# Patient Record
Sex: FEMALE | Race: WHITE | Employment: PART TIME | URBAN - METROPOLITAN AREA
[De-identification: names, ages, dates, MRNs, and addresses within clinical notes are randomized per-mention and may not be internally consistent; named-entity substitution may affect disease eponyms.]

---

## 2018-10-16 ENCOUNTER — HOSPITAL ENCOUNTER (OUTPATIENT)
Age: 18
Discharge: HOME | End: 2018-10-16
Payer: COMMERCIAL

## 2018-10-16 ENCOUNTER — HOSPITAL ENCOUNTER (OUTPATIENT)
Dept: GENERAL RADIOLOGY | Age: 18
Discharge: HOME | End: 2018-10-16
Payer: COMMERCIAL

## 2018-10-16 DIAGNOSIS — R05.9 COUGH: ICD-10-CM

## 2018-10-16 PROCEDURE — 71046 X-RAY EXAM CHEST 2 VIEWS: CPT

## 2019-01-15 ENCOUNTER — HOSPITAL ENCOUNTER (OUTPATIENT)
Age: 19
Setting detail: SPECIMEN
Discharge: HOME | End: 2019-01-15
Payer: COMMERCIAL

## 2019-01-15 PROCEDURE — 87491 CHLMYD TRACH DNA AMP PROBE: CPT

## 2019-01-15 PROCEDURE — 87071 CULTURE AEROBIC QUANT OTHER: CPT

## 2019-01-15 PROCEDURE — 87591 N.GONORRHOEAE DNA AMP PROB: CPT

## 2019-01-15 PROCEDURE — 88142 CYTOPATH C/V THIN LAYER: CPT

## 2019-01-18 LAB
CHLAMYDIA TRACHOMATIS AMPLIFIED DET: NEGATIVE
N GONORRHOEAE AMPLIFIED DET: NEGATIVE

## 2019-01-19 LAB
CULTURE: NORMAL
GRAM SMEAR: NORMAL
Lab: NORMAL
REPORT STATUS: NORMAL
SPECIMEN: NORMAL

## 2019-02-12 ENCOUNTER — HOSPITAL ENCOUNTER (OUTPATIENT)
Age: 19
Setting detail: SPECIMEN
Discharge: HOME | End: 2019-02-12
Payer: COMMERCIAL

## 2019-02-12 PROCEDURE — 87624 HPV HI-RISK TYP POOLED RSLT: CPT

## 2019-02-12 PROCEDURE — 87071 CULTURE AEROBIC QUANT OTHER: CPT

## 2019-02-15 LAB
HPV SOURCE: NORMAL
HPV, HIGH RISK OTHER: NEGATIVE

## 2019-02-16 LAB
CULTURE: NORMAL
GRAM SMEAR: NORMAL
Lab: NORMAL
REPORT STATUS: NORMAL
SPECIMEN: NORMAL

## 2019-02-21 ENCOUNTER — HOSPITAL ENCOUNTER (OUTPATIENT)
Age: 19
Setting detail: SPECIMEN
Discharge: HOME | End: 2019-02-21
Payer: COMMERCIAL

## 2019-02-21 PROCEDURE — 88305 TISSUE EXAM BY PATHOLOGIST: CPT

## 2019-07-30 ENCOUNTER — HOSPITAL ENCOUNTER (OUTPATIENT)
Dept: MRI IMAGING | Age: 19
Discharge: HOME | End: 2019-07-30
Payer: COMMERCIAL

## 2019-07-30 DIAGNOSIS — R79.89 ELEVATED DHEA: ICD-10-CM

## 2019-07-30 LAB
GFR AFRICAN AMERICAN: >60 ML/MIN/1.73M2
GFR NON-AFRICAN AMERICAN: >60 ML/MIN/1.73M2
POC CREATININE: 0.7 MG/DL (ref 0.6–1.1)

## 2019-07-30 PROCEDURE — 6360000004 HC RX CONTRAST MEDICATION: Performed by: NURSE PRACTITIONER

## 2019-07-30 PROCEDURE — 74183 MRI ABD W/O CNTR FLWD CNTR: CPT

## 2019-07-30 PROCEDURE — A9577 INJ MULTIHANCE: HCPCS | Performed by: NURSE PRACTITIONER

## 2019-07-30 RX ADMIN — GADOBENATE DIMEGLUMINE 15 ML: 529 INJECTION, SOLUTION INTRAVENOUS at 12:54

## 2019-08-13 ENCOUNTER — HOSPITAL ENCOUNTER (OUTPATIENT)
Age: 19
Setting detail: SPECIMEN
Discharge: HOME | End: 2019-08-13
Payer: COMMERCIAL

## 2019-08-13 LAB
FERRITIN: 38 NG/ML (ref 15–150)
IRON: 99 UG/DL (ref 37–145)
PCT TRANSFERRIN: 29 % (ref 10–44)
TOTAL IRON BINDING CAPACITY: 340 UG/DL (ref 250–450)
UNSATURATED IRON BINDING CAPACITY: 241 UG/DL (ref 110–370)

## 2019-08-13 PROCEDURE — 81256 HFE GENE: CPT

## 2019-08-13 PROCEDURE — 83550 IRON BINDING TEST: CPT

## 2019-08-13 PROCEDURE — 82728 ASSAY OF FERRITIN: CPT

## 2019-08-13 PROCEDURE — 84238 ASSAY NONENDOCRINE RECEPTOR: CPT

## 2019-08-13 PROCEDURE — 83540 ASSAY OF IRON: CPT

## 2019-08-15 LAB
SOLUBLE TRANSFERRIN RECEPT: 2.6 MG/L (ref 1.9–4.4)
SOLUBLE TRANSFERRIN RECEPT: NORMAL MG/L (ref 1.9–4.4)

## 2019-08-18 LAB
C282Y HEMOCHROMATOSIS MUT: NEGATIVE
H63D HEMOCHROMATOSIS MUT: NEGATIVE
HEMOCHROMATOSIS MUTATION C282Y/H63D: NORMAL
HEMOCHROMATOSIS MUTATION C282Y/H63D: NORMAL
HFE PCR SPECIMEN: NORMAL
S65C HEMOCHROMATOSIS MUT: NEGATIVE

## 2020-02-12 ENCOUNTER — HOSPITAL ENCOUNTER (OUTPATIENT)
Age: 20
Setting detail: SPECIMEN
Discharge: HOME | End: 2020-02-12
Payer: COMMERCIAL

## 2020-02-12 PROCEDURE — 88142 CYTOPATH C/V THIN LAYER: CPT

## 2020-02-12 PROCEDURE — 87801 DETECT AGNT MULT DNA AMPLI: CPT

## 2020-02-17 LAB
CHLAMYDIA BY THIN PREP: NEGATIVE
GC BY THIN PREP: NEGATIVE
GC BY THIN PREP: NORMAL

## 2020-11-19 ENCOUNTER — HOSPITAL ENCOUNTER (EMERGENCY)
Age: 20
Discharge: HOME | End: 2020-11-19
Attending: EMERGENCY MEDICINE
Payer: COMMERCIAL

## 2020-11-19 ENCOUNTER — APPOINTMENT (OUTPATIENT)
Dept: CT IMAGING | Age: 20
End: 2020-11-19
Payer: COMMERCIAL

## 2020-11-19 VITALS
DIASTOLIC BLOOD PRESSURE: 98 MMHG | HEART RATE: 98 BPM | HEIGHT: 62 IN | TEMPERATURE: 98.2 F | OXYGEN SATURATION: 98 % | WEIGHT: 200 LBS | BODY MASS INDEX: 36.8 KG/M2 | SYSTOLIC BLOOD PRESSURE: 144 MMHG | RESPIRATION RATE: 15 BRPM

## 2020-11-19 LAB
ALBUMIN SERPL-MCNC: 4.3 GM/DL (ref 3.4–5)
ALP BLD-CCNC: 69 IU/L (ref 40–129)
ALT SERPL-CCNC: 17 U/L (ref 10–40)
ANION GAP SERPL CALCULATED.3IONS-SCNC: 11 MMOL/L (ref 4–16)
AST SERPL-CCNC: 20 IU/L (ref 15–37)
BACTERIA: ABNORMAL /HPF
BASOPHILS ABSOLUTE: 0 K/CU MM
BASOPHILS RELATIVE PERCENT: 0.4 % (ref 0–1)
BILIRUB SERPL-MCNC: 0.2 MG/DL (ref 0–1)
BILIRUBIN URINE: NEGATIVE MG/DL
BLOOD, URINE: NEGATIVE
BUN BLDV-MCNC: 14 MG/DL (ref 6–23)
CALCIUM SERPL-MCNC: 9.3 MG/DL (ref 8.3–10.6)
CAST TYPE: ABNORMAL /HPF
CHLORIDE BLD-SCNC: 105 MMOL/L (ref 99–110)
CLARITY: CLEAR
CO2: 24 MMOL/L (ref 21–32)
COLOR: YELLOW
COMMENT UA: ABNORMAL
CREAT SERPL-MCNC: 0.8 MG/DL (ref 0.6–1.1)
CRYSTAL TYPE: NEGATIVE /HPF
DIFFERENTIAL TYPE: ABNORMAL
EOSINOPHILS ABSOLUTE: 0.1 K/CU MM
EOSINOPHILS RELATIVE PERCENT: 1.4 % (ref 0–3)
EPITHELIAL CELLS, UA: 4 /HPF
GFR AFRICAN AMERICAN: >60 ML/MIN/1.73M2
GFR NON-AFRICAN AMERICAN: >60 ML/MIN/1.73M2
GLUCOSE BLD-MCNC: 96 MG/DL (ref 70–99)
GLUCOSE, URINE: NEGATIVE MG/DL
HCG QUALITATIVE: NEGATIVE
HCT VFR BLD CALC: 38.4 % (ref 37–47)
HEMOGLOBIN: 12.9 GM/DL (ref 12.5–16)
IMMATURE NEUTROPHIL %: 0.2 % (ref 0–0.43)
KETONES, URINE: NEGATIVE MG/DL
LEUKOCYTE ESTERASE, URINE: ABNORMAL
LYMPHOCYTES ABSOLUTE: 2.2 K/CU MM
LYMPHOCYTES RELATIVE PERCENT: 27.5 % (ref 24–44)
MCH RBC QN AUTO: 28.3 PG (ref 27–31)
MCHC RBC AUTO-ENTMCNC: 33.6 % (ref 32–36)
MCV RBC AUTO: 84.2 FL (ref 78–100)
MONOCYTES ABSOLUTE: 0.7 K/CU MM
MONOCYTES RELATIVE PERCENT: 9.2 % (ref 0–4)
NITRITE URINE, QUANTITATIVE: NEGATIVE
PDW BLD-RTO: 13 % (ref 11.7–14.9)
PH, URINE: 6 (ref 5–8)
PLATELET # BLD: 344 K/CU MM (ref 140–440)
PMV BLD AUTO: 8.4 FL (ref 7.5–11.1)
POTASSIUM SERPL-SCNC: 3.7 MMOL/L (ref 3.5–5.1)
PROTEIN UA: 30 MG/DL
RBC # BLD: 4.56 M/CU MM (ref 4.2–5.4)
RBC URINE: ABNORMAL /HPF (ref 0–6)
SEGMENTED NEUTROPHILS ABSOLUTE COUNT: 4.9 K/CU MM
SEGMENTED NEUTROPHILS RELATIVE PERCENT: 61.3 % (ref 36–66)
SODIUM BLD-SCNC: 140 MMOL/L (ref 135–145)
SPECIFIC GRAVITY UA: 1.03 (ref 1–1.03)
TOTAL IMMATURE NEUTOROPHIL: 0.02 K/CU MM
TOTAL PROTEIN: 7.3 GM/DL (ref 6.4–8.2)
UROBILINOGEN, URINE: 0.2 MG/DL (ref 0.2–1)
WBC # BLD: 8.1 K/CU MM (ref 4–10.5)
WBC UA: 2 /HPF (ref 0–5)

## 2020-11-19 PROCEDURE — 81001 URINALYSIS AUTO W/SCOPE: CPT

## 2020-11-19 PROCEDURE — 84703 CHORIONIC GONADOTROPIN ASSAY: CPT

## 2020-11-19 PROCEDURE — 96374 THER/PROPH/DIAG INJ IV PUSH: CPT

## 2020-11-19 PROCEDURE — 96375 TX/PRO/DX INJ NEW DRUG ADDON: CPT

## 2020-11-19 PROCEDURE — 99282 EMERGENCY DEPT VISIT SF MDM: CPT

## 2020-11-19 PROCEDURE — 85025 COMPLETE CBC W/AUTO DIFF WBC: CPT

## 2020-11-19 PROCEDURE — 80053 COMPREHEN METABOLIC PANEL: CPT

## 2020-11-19 PROCEDURE — 74176 CT ABD & PELVIS W/O CONTRAST: CPT

## 2020-11-19 PROCEDURE — 6360000002 HC RX W HCPCS: Performed by: EMERGENCY MEDICINE

## 2020-11-19 RX ORDER — KETOROLAC TROMETHAMINE 30 MG/ML
15 INJECTION, SOLUTION INTRAMUSCULAR; INTRAVENOUS ONCE
Status: COMPLETED | OUTPATIENT
Start: 2020-11-19 | End: 2020-11-19

## 2020-11-19 RX ORDER — LEVOTHYROXINE SODIUM 0.07 MG/1
75 TABLET ORAL DAILY
COMMUNITY

## 2020-11-19 RX ORDER — HYDROCODONE BITARTRATE AND ACETAMINOPHEN 5; 325 MG/1; MG/1
1 TABLET ORAL EVERY 6 HOURS PRN
Qty: 10 TABLET | Refills: 0 | Status: SHIPPED | OUTPATIENT
Start: 2020-11-19 | End: 2020-11-22

## 2020-11-19 RX ORDER — ONDANSETRON 2 MG/ML
4 INJECTION INTRAMUSCULAR; INTRAVENOUS
Status: DISCONTINUED | OUTPATIENT
Start: 2020-11-19 | End: 2020-11-19 | Stop reason: HOSPADM

## 2020-11-19 RX ORDER — ONDANSETRON 4 MG/1
4 TABLET, FILM COATED ORAL DAILY PRN
Qty: 30 TABLET | Refills: 0 | Status: SHIPPED | OUTPATIENT
Start: 2020-11-19

## 2020-11-19 RX ORDER — IBUPROFEN 400 MG/1
400 TABLET ORAL EVERY 6 HOURS PRN
Qty: 120 TABLET | Refills: 0 | Status: SHIPPED | OUTPATIENT
Start: 2020-11-19

## 2020-11-19 RX ORDER — BUSPIRONE HYDROCHLORIDE 10 MG/1
10 TABLET ORAL 2 TIMES DAILY
COMMUNITY

## 2020-11-19 RX ADMIN — ONDANSETRON 4 MG: 2 INJECTION INTRAMUSCULAR; INTRAVENOUS at 19:33

## 2020-11-19 RX ADMIN — KETOROLAC TROMETHAMINE 15 MG: 30 INJECTION, SOLUTION INTRAMUSCULAR; INTRAVENOUS at 19:32

## 2020-11-19 ASSESSMENT — PAIN DESCRIPTION - ORIENTATION: ORIENTATION: LEFT

## 2020-11-19 ASSESSMENT — PAIN SCALES - GENERAL
PAINLEVEL_OUTOF10: 7
PAINLEVEL_OUTOF10: 9

## 2020-11-19 ASSESSMENT — PAIN DESCRIPTION - LOCATION: LOCATION: ABDOMEN

## 2020-11-19 ASSESSMENT — PAIN DESCRIPTION - FREQUENCY: FREQUENCY: INTERMITTENT

## 2020-11-19 NOTE — ED PROVIDER NOTES
Emergency Physician Note    Chief Complaint  Abdominal Pain       History of Present Illness  Chava Antonio is a 23 y.o. female who presents to the ED for left lower quadrant pain. Patient reports fairly sudden onset of left lower quadrant and left flank pain Tuesday morning. Since then she had constant pain that waxes and wanes. She denies any vomiting but has had some nausea. She denies any urinary or vaginal symptoms and no diarrhea. Her menstrual cycle just finished 2 days ago. It was on time and typical for her. She has a history of PCOS. 10 systems reviewed, pertinent positives per HPI otherwise noted to be negative    I have reviewed the following from the nursing documentation:      Prior to Admission medications    Medication Sig Start Date End Date Taking? Authorizing Provider   levothyroxine (SYNTHROID) 75 MCG tablet Take 75 mcg by mouth Daily   Yes Historical Provider, MD   buPROPion HCl (WELLBUTRIN SR PO) Take by mouth daily   Yes Historical Provider, MD   busPIRone (BUSPAR) 10 MG tablet Take 10 mg by mouth 2 times daily   Yes Historical Provider, MD   HYDROXYZINE HCL PO Take by mouth 3 times daily   Yes Historical Provider, MD   ondansetron (ZOFRAN ODT) 4 MG disintegrating tablet Take 1 tablet by mouth every 8 hours as needed for Nausea or Vomiting 8/5/17   Kristina Sandoval MD       Allergies as of 11/19/2020 - Review Complete 11/19/2020   Allergen Reaction Noted   Buelah Soda [loracarbef]  02/03/2017       Past Medical History:   Diagnosis Date    Headache(784.0)     Hypertension         Surgical History: No past surgical history on file. Family History:  No family history on file.     Social History     Socioeconomic History    Marital status: Single     Spouse name: Not on file    Number of children: Not on file    Years of education: Not on file    Highest education level: Not on file   Occupational History    Not on file   Social Needs    Financial resource strain: Not on file guarding, non-distended, normal bowel sounds. Mild left costovertebral angle tenderness to palpation. BACK:  No tenderness. EXTREMITIES:  Normal range of motion, no edema, no bony tenderness, no deformity, distal pulses present. SKIN: Warm, dry and intact. NEUROLOGIC: Normal mental status. Moving all extremities to command. RADIOLOGY  X-RAYS:  I have reviewed radiologic plain film image(s). ALL OTHER NON-PLAIN FILM IMAGES SUCH AS CT, ULTRASOUND AND MRI HAVE BEEN READ BY THE RADIOLOGIST. CT ABDOMEN PELVIS WO CONTRAST Additional Contrast? None    (Results Pending)        LABS  Labs Reviewed   CBC WITH AUTO DIFFERENTIAL   COMPREHENSIVE METABOLIC PANEL W/ REFLEX TO MG FOR LOW K   URINE RT REFLEX TO CULTURE   HCG, SERUM, QUALITATIVE       MEDICAL DECISION MAKING     6:56 PM: I discussed the history, physical, and treatment plan with Dr. Lynda Caraballo. Catherine Stahl was signed out in stable condition. Please see Dr. Landry Repress note for further details, including diagnosis and disposition. This chart was generated using the [address] [place] [address] dictation system. I created this record but it may contain dictation errors.           Lala Hendrickson MD  11/19/20 8805

## 2020-11-19 NOTE — ED TRIAGE NOTES
Pt arrives with complaints of left lower quadrant pain, started two days ago, reports that the pain is constant, sharp at times, reports nausea and vomiting twice today, denies any fever or chills, denies urinary or vaginal complaints,

## 2020-11-20 NOTE — ED PROVIDER NOTES
Chino Her was checked out to me by Dr. Mckayla Powell. Please see his/her initial documentation for details of the patient's ED presentation, physical exam and completed studies. In brief, Chino Her is a 23 y.o. female that presents with left lower abdominal pain. Patient with a history of PCOS and ovarian cyst however timing is off her her menses currently.     Labs  Results for orders placed or performed during the hospital encounter of 11/19/20   CBC Auto Differential   Result Value Ref Range    WBC 8.1 4.0 - 10.5 K/CU MM    RBC 4.56 4.2 - 5.4 M/CU MM    Hemoglobin 12.9 12.5 - 16.0 GM/DL    Hematocrit 38.4 37 - 47 %    MCV 84.2 78 - 100 FL    MCH 28.3 27 - 31 PG    MCHC 33.6 32.0 - 36.0 %    RDW 13.0 11.7 - 14.9 %    Platelets 180 089 - 591 K/CU MM    MPV 8.4 7.5 - 11.1 FL    Differential Type AUTOMATED DIFFERENTIAL     Segs Relative 61.3 36 - 66 %    Lymphocytes % 27.5 24 - 44 %    Monocytes % 9.2 (H) 0 - 4 %    Eosinophils % 1.4 0 - 3 %    Basophils % 0.4 0 - 1 %    Segs Absolute 4.9 K/CU MM    Lymphocytes Absolute 2.2 K/CU MM    Monocytes Absolute 0.7 K/CU MM    Eosinophils Absolute 0.1 K/CU MM    Basophils Absolute 0.0 K/CU MM    Immature Neutrophil % 0.2 0 - 0.43 %    Total Immature Neutrophil 0.02 K/CU MM   Comprehensive Metabolic Panel w/ Reflex to MG   Result Value Ref Range    Sodium 140 135 - 145 MMOL/L    Potassium 3.7 3.5 - 5.1 MMOL/L    Chloride 105 99 - 110 mMol/L    CO2 24 21 - 32 MMOL/L    BUN 14 6 - 23 MG/DL    CREATININE 0.8 0.6 - 1.1 MG/DL    Glucose 96 70 - 99 MG/DL    Calcium 9.3 8.3 - 10.6 MG/DL    Alb 4.3 3.4 - 5.0 GM/DL    Total Protein 7.3 6.4 - 8.2 GM/DL    Total Bilirubin 0.2 0.0 - 1.0 MG/DL    ALT 17 10 - 40 U/L    AST 20 15 - 37 IU/L    Alkaline Phosphatase 69 40 - 129 IU/L    GFR Non-African American >60 >60 mL/min/1.73m2    GFR African American >60 >60 mL/min/1.73m2    Anion Gap 11 4 - 16   Urinalysis Reflex to Culture    Specimen: Urine   Result Value Ref Range    Color, UA YELLOW YELLOW    Clarity, UA CLEAR CLEAR    Glucose, Urine NEGATIVE NEGATIVE MG/DL    Bilirubin Urine NEGATIVE NEGATIVE MG/DL    Ketones, Urine NEGATIVE NEGATIVE MG/DL    Specific Gravity, UA 1.030 1.001 - 1.035    Blood, Urine NEGATIVE NEGATIVE    pH, Urine 6.0 5.0 - 8.0    Protein, UA 30 (A) NEGATIVE MG/DL    Urobilinogen, Urine 0.2 0.2 - 1.0 MG/DL    Nitrite Urine, Quantitative NEGATIVE NEGATIVE    Leukocyte Esterase, Urine TRACE (A) NEGATIVE    RBC, UA NO CELLS SEEN 0 - 6 /HPF    WBC, UA 2 0 - 5 /HPF    Epithelial Cells, UA 4 /HPF    Cast Type NO CAST FORMS SEEN NO CAST FORMS SEEN /HPF    Bacteria, UA FEW (A) NEGATIVE /HPF    Crystal Type NEGATIVE NEGATIVE /HPF    Urinalysis Comments MUCUS PRESENT    HCG Qualitative, Serum   Result Value Ref Range    hCG Qual NEGATIVE        MDM:  Patient endorsed to me pending labs and imaging for her left lower abdominal pain. hCG negative. Urinalysis is not consistent with a urinary tract infection. CBC and CMP without clinically significant derangement occluding normal white count. CT imaging does demonstrate an epiploic appendagitis of her descending colon which does fit with overall clinical picture. Patient is well-appearing on recheck and is appropriate for the outpatient follow-up. I will treat with scheduled Motrin and as needed Norco for more severe pains and provided a prescription for Zofran as well. Discussed the need to call primary care provider tomorrow to arrange for close follow-up for recheck. Additionally, spoke of specific signs symptoms of what to watch for and when to return to the emergency department. Final Impression:  1. Epiploic appendagitis          Please note that portions of this note may have been complete with a voice recognition program.  Efforts were made to edit the dictations, but occasional words are mis-transcribed.          Kristopher Marley MD  11/19/20 2024

## 2020-12-07 ENCOUNTER — HOSPITAL ENCOUNTER (EMERGENCY)
Age: 20
Discharge: PSYCHIATRIC OR SUBSTANCE USE FACILITY | End: 2020-12-08
Attending: EMERGENCY MEDICINE
Payer: COMMERCIAL

## 2020-12-07 LAB
ACETAMINOPHEN LEVEL: <5 UG/ML (ref 15–30)
ALBUMIN SERPL-MCNC: 5 GM/DL (ref 3.4–5)
ALCOHOL SCREEN SERUM: 0.25 %WT/VOL
ALP BLD-CCNC: 63 IU/L (ref 40–129)
ALT SERPL-CCNC: 24 U/L (ref 10–40)
ANION GAP SERPL CALCULATED.3IONS-SCNC: 17 MMOL/L (ref 4–16)
AST SERPL-CCNC: 25 IU/L (ref 15–37)
BASOPHILS ABSOLUTE: 0 K/CU MM
BASOPHILS RELATIVE PERCENT: 0.4 % (ref 0–1)
BILIRUB SERPL-MCNC: 0.2 MG/DL (ref 0–1)
BUN BLDV-MCNC: 8 MG/DL (ref 6–23)
CALCIUM SERPL-MCNC: 9.4 MG/DL (ref 8.3–10.6)
CHLORIDE BLD-SCNC: 102 MMOL/L (ref 99–110)
CO2: 21 MMOL/L (ref 21–32)
CREAT SERPL-MCNC: 0.8 MG/DL (ref 0.6–1.1)
DIFFERENTIAL TYPE: ABNORMAL
DOSE AMOUNT: ABNORMAL
DOSE AMOUNT: ABNORMAL
DOSE TIME: ABNORMAL
DOSE TIME: ABNORMAL
EOSINOPHILS ABSOLUTE: 0.1 K/CU MM
EOSINOPHILS RELATIVE PERCENT: 0.8 % (ref 0–3)
GFR AFRICAN AMERICAN: >60 ML/MIN/1.73M2
GFR NON-AFRICAN AMERICAN: >60 ML/MIN/1.73M2
GLUCOSE BLD-MCNC: 96 MG/DL (ref 70–99)
HCT VFR BLD CALC: 47.2 % (ref 37–47)
HEMOGLOBIN: 15.5 GM/DL (ref 12.5–16)
IMMATURE NEUTROPHIL %: 0.4 % (ref 0–0.43)
LYMPHOCYTES ABSOLUTE: 2 K/CU MM
LYMPHOCYTES RELATIVE PERCENT: 26 % (ref 24–44)
MCH RBC QN AUTO: 28.1 PG (ref 27–31)
MCHC RBC AUTO-ENTMCNC: 32.8 % (ref 32–36)
MCV RBC AUTO: 85.7 FL (ref 78–100)
MONOCYTES ABSOLUTE: 0.5 K/CU MM
MONOCYTES RELATIVE PERCENT: 6.5 % (ref 0–4)
NUCLEATED RBC %: 0 %
PDW BLD-RTO: 13.2 % (ref 11.7–14.9)
PLATELET # BLD: 344 K/CU MM (ref 140–440)
PMV BLD AUTO: 8.3 FL (ref 7.5–11.1)
POTASSIUM SERPL-SCNC: 3.7 MMOL/L (ref 3.5–5.1)
RBC # BLD: 5.51 M/CU MM (ref 4.2–5.4)
SALICYLATE LEVEL: <0.3 MG/DL (ref 15–30)
SEGMENTED NEUTROPHILS ABSOLUTE COUNT: 5 K/CU MM
SEGMENTED NEUTROPHILS RELATIVE PERCENT: 65.9 % (ref 36–66)
SODIUM BLD-SCNC: 140 MMOL/L (ref 135–145)
TOTAL IMMATURE NEUTOROPHIL: 0.03 K/CU MM
TOTAL NUCLEATED RBC: 0 K/CU MM
TOTAL PROTEIN: 8.3 GM/DL (ref 6.4–8.2)
WBC # BLD: 7.7 K/CU MM (ref 4–10.5)

## 2020-12-07 PROCEDURE — 84703 CHORIONIC GONADOTROPIN ASSAY: CPT

## 2020-12-07 PROCEDURE — 36415 COLL VENOUS BLD VENIPUNCTURE: CPT

## 2020-12-07 PROCEDURE — 85025 COMPLETE CBC W/AUTO DIFF WBC: CPT

## 2020-12-07 PROCEDURE — G0480 DRUG TEST DEF 1-7 CLASSES: HCPCS

## 2020-12-07 PROCEDURE — 80053 COMPREHEN METABOLIC PANEL: CPT

## 2020-12-07 PROCEDURE — 80307 DRUG TEST PRSMV CHEM ANLYZR: CPT

## 2020-12-07 PROCEDURE — 84443 ASSAY THYROID STIM HORMONE: CPT

## 2020-12-07 PROCEDURE — 99285 EMERGENCY DEPT VISIT HI MDM: CPT | Performed by: EMERGENCY MEDICINE

## 2020-12-08 VITALS
HEIGHT: 67 IN | HEART RATE: 92 BPM | SYSTOLIC BLOOD PRESSURE: 136 MMHG | BODY MASS INDEX: 29.82 KG/M2 | OXYGEN SATURATION: 100 % | RESPIRATION RATE: 18 BRPM | WEIGHT: 190 LBS | DIASTOLIC BLOOD PRESSURE: 81 MMHG | TEMPERATURE: 97.9 F

## 2020-12-08 LAB
ALBUMIN SERPL-MCNC: 4.3 GM/DL (ref 3.4–5)
ALCOHOL SCREEN SERUM: 0.08 %WT/VOL
ALCOHOL SCREEN SERUM: <0.01 %WT/VOL
ALP BLD-CCNC: 57 IU/L (ref 40–129)
ALT SERPL-CCNC: 20 U/L (ref 10–40)
AMPHETAMINES: NEGATIVE
ANION GAP SERPL CALCULATED.3IONS-SCNC: 14 MMOL/L (ref 4–16)
AST SERPL-CCNC: 24 IU/L (ref 15–37)
BARBITURATE SCREEN URINE: NEGATIVE
BASOPHILS ABSOLUTE: 0 K/CU MM
BASOPHILS RELATIVE PERCENT: 0.5 % (ref 0–1)
BENZODIAZEPINE SCREEN, URINE: NEGATIVE
BILIRUB SERPL-MCNC: 0.4 MG/DL (ref 0–1)
BUN BLDV-MCNC: 8 MG/DL (ref 6–23)
CALCIUM SERPL-MCNC: 9 MG/DL (ref 8.3–10.6)
CANNABINOID SCREEN URINE: NEGATIVE
CHLORIDE BLD-SCNC: 100 MMOL/L (ref 99–110)
CO2: 21 MMOL/L (ref 21–32)
COCAINE METABOLITE: NEGATIVE
CREAT SERPL-MCNC: 0.7 MG/DL (ref 0.6–1.1)
DIFFERENTIAL TYPE: ABNORMAL
EOSINOPHILS ABSOLUTE: 0.1 K/CU MM
EOSINOPHILS RELATIVE PERCENT: 0.8 % (ref 0–3)
GFR AFRICAN AMERICAN: >60 ML/MIN/1.73M2
GFR NON-AFRICAN AMERICAN: >60 ML/MIN/1.73M2
GLUCOSE BLD-MCNC: 110 MG/DL (ref 70–99)
HCG QUALITATIVE: NEGATIVE
HCT VFR BLD CALC: 43.3 % (ref 37–47)
HEMOGLOBIN: 15.2 GM/DL (ref 12.5–16)
IMMATURE NEUTROPHIL %: 0.3 % (ref 0–0.43)
LYMPHOCYTES ABSOLUTE: 2 K/CU MM
LYMPHOCYTES RELATIVE PERCENT: 22.4 % (ref 24–44)
MCH RBC QN AUTO: 29.3 PG (ref 27–31)
MCHC RBC AUTO-ENTMCNC: 35.1 % (ref 32–36)
MCV RBC AUTO: 83.6 FL (ref 78–100)
MONOCYTES ABSOLUTE: 0.7 K/CU MM
MONOCYTES RELATIVE PERCENT: 8.4 % (ref 0–4)
NUCLEATED RBC %: 0 %
OPIATES, URINE: NEGATIVE
OXYCODONE: NEGATIVE
PDW BLD-RTO: 13.3 % (ref 11.7–14.9)
PHENCYCLIDINE, URINE: NEGATIVE
PLATELET # BLD: 336 K/CU MM (ref 140–440)
PMV BLD AUTO: 8.3 FL (ref 7.5–11.1)
POTASSIUM SERPL-SCNC: 3.6 MMOL/L (ref 3.5–5.1)
RBC # BLD: 5.18 M/CU MM (ref 4.2–5.4)
SARS-COV-2, NAAT: NOT DETECTED
SEGMENTED NEUTROPHILS ABSOLUTE COUNT: 5.9 K/CU MM
SEGMENTED NEUTROPHILS RELATIVE PERCENT: 67.6 % (ref 36–66)
SODIUM BLD-SCNC: 135 MMOL/L (ref 135–145)
SOURCE: NORMAL
TOTAL IMMATURE NEUTOROPHIL: 0.03 K/CU MM
TOTAL NUCLEATED RBC: 0 K/CU MM
TOTAL PROTEIN: 7.4 GM/DL (ref 6.4–8.2)
TSH HIGH SENSITIVITY: 1 UIU/ML (ref 0.27–4.2)
WBC # BLD: 8.7 K/CU MM (ref 4–10.5)

## 2020-12-08 PROCEDURE — 94761 N-INVAS EAR/PLS OXIMETRY MLT: CPT

## 2020-12-08 PROCEDURE — G0480 DRUG TEST DEF 1-7 CLASSES: HCPCS

## 2020-12-08 PROCEDURE — 85025 COMPLETE CBC W/AUTO DIFF WBC: CPT

## 2020-12-08 PROCEDURE — 80053 COMPREHEN METABOLIC PANEL: CPT

## 2020-12-08 PROCEDURE — U0002 COVID-19 LAB TEST NON-CDC: HCPCS

## 2020-12-08 PROCEDURE — 6370000000 HC RX 637 (ALT 250 FOR IP): Performed by: EMERGENCY MEDICINE

## 2020-12-08 PROCEDURE — 83721 ASSAY OF BLOOD LIPOPROTEIN: CPT

## 2020-12-08 PROCEDURE — 80061 LIPID PANEL: CPT

## 2020-12-08 RX ORDER — ONDANSETRON 4 MG/1
4 TABLET, ORALLY DISINTEGRATING ORAL ONCE
Status: COMPLETED | OUTPATIENT
Start: 2020-12-08 | End: 2020-12-08

## 2020-12-08 RX ORDER — BUSPIRONE HYDROCHLORIDE 10 MG/1
10 TABLET ORAL ONCE
Status: COMPLETED | OUTPATIENT
Start: 2020-12-08 | End: 2020-12-08

## 2020-12-08 RX ORDER — BUPROPION HYDROCHLORIDE 100 MG/1
200 TABLET ORAL ONCE
Status: COMPLETED | OUTPATIENT
Start: 2020-12-08 | End: 2020-12-08

## 2020-12-08 RX ORDER — LORAZEPAM 1 MG/1
1 TABLET ORAL ONCE
Status: COMPLETED | OUTPATIENT
Start: 2020-12-08 | End: 2020-12-08

## 2020-12-08 RX ORDER — LEVOTHYROXINE SODIUM 0.07 MG/1
75 TABLET ORAL ONCE
Status: COMPLETED | OUTPATIENT
Start: 2020-12-08 | End: 2020-12-08

## 2020-12-08 RX ADMIN — BUSPIRONE HYDROCHLORIDE 10 MG: 10 TABLET ORAL at 12:35

## 2020-12-08 RX ADMIN — BUPROPION HYDROCHLORIDE 200 MG: 100 TABLET, FILM COATED ORAL at 12:36

## 2020-12-08 RX ADMIN — LEVOTHYROXINE SODIUM 75 MCG: 75 TABLET ORAL at 12:36

## 2020-12-08 RX ADMIN — LORAZEPAM 1 MG: 1 TABLET ORAL at 19:19

## 2020-12-08 RX ADMIN — ONDANSETRON 4 MG: 4 TABLET, ORALLY DISINTEGRATING ORAL at 09:53

## 2020-12-08 NOTE — ED NOTES
Pt was provided a warm blanket. She is laying back down to sleep. Sitter is at the bedside.       Rose Mulligan RN  12/08/20 4356

## 2020-12-08 NOTE — ED NOTES
Pt is asleep at this time. No distress noted. Sitter at the bedside.       Naveed Calixto RN  12/08/20 4022

## 2020-12-08 NOTE — ED NOTES
Pt arrives via [facility] for [facility]. PD states pt got into a fight with her mother and said \"Im going to take a bunch of pills and kill myself. \" Mother called the police. Pt states she doesn't want to be here and she doesn't want to kill herself.       Favian Thomas RN  12/07/20 0891

## 2020-12-08 NOTE — ED NOTES
Patient awake. Inquiring what time it was. Informed patient of time. Patient requesting pillow. Will attempt to provide. Patient states \"can I just be discharged? \" Updated patient that she will need blood draw around 0630 and evaluated by MH prior to any decision about being discharged. Patient remains on bed.  attempting to locate pillow. Courtney Garcia, EDILSON  12/08/20 0015    [place] provided in place of pillow.      Courtney Garcia RN  12/08/20 4450

## 2020-12-08 NOTE — ED NOTES
Pt is in bed sitting and talking to the sitter. No distress is noted. Sitter remains at the bedside.       Melchor Leach RN  12/08/20 0298

## 2020-12-08 NOTE — ED PROVIDER NOTES
Please refer to the documentation completed by Dr. Sparkle Torres for full details of the encounter. Results:  Results for orders placed or performed during the hospital encounter of 12/07/20   Ethanol Level   Result Value Ref Range    Alcohol Scrn 0.25 (H) <0.01 %WT/VOL   Acetaminophen Level   Result Value Ref Range    Acetaminophen Level <5.0 (L) 15 - 30 ug/ml    DOSE AMOUNT DOSE AMT. GIVEN - UNKNOWN     DOSE TIME DOSE TIME GIVEN - UNKNOWN    Salicylate Level   Result Value Ref Range    Salicylate Lvl <8.7 (L) 15 - 30 MG/DL    DOSE AMOUNT DOSE AMT.  GIVEN - UNKNOWN     DOSE TIME DOSE TIME GIVEN - UNKNOWN    HCG Serum, Qualitative   Result Value Ref Range    hCG Qual NEGATIVE    CBC with Auto Diff   Result Value Ref Range    WBC 7.7 4.0 - 10.5 K/CU MM    RBC 5.51 (H) 4.2 - 5.4 M/CU MM    Hemoglobin 15.5 12.5 - 16.0 GM/DL    Hematocrit 47.2 (H) 37 - 47 %    MCV 85.7 78 - 100 FL    MCH 28.1 27 - 31 PG    MCHC 32.8 32.0 - 36.0 %    RDW 13.2 11.7 - 14.9 %    Platelets 287 977 - 285 K/CU MM    MPV 8.3 7.5 - 11.1 FL    Differential Type AUTOMATED DIFFERENTIAL     Segs Relative 65.9 36 - 66 %    Lymphocytes % 26.0 24 - 44 %    Monocytes % 6.5 (H) 0 - 4 %    Eosinophils % 0.8 0 - 3 %    Basophils % 0.4 0 - 1 %    Segs Absolute 5.0 K/CU MM    Lymphocytes Absolute 2.0 K/CU MM    Monocytes Absolute 0.5 K/CU MM    Eosinophils Absolute 0.1 K/CU MM    Basophils Absolute 0.0 K/CU MM    Nucleated RBC % 0.0 %    Total Nucleated RBC 0.0 K/CU MM    Total Immature Neutrophil 0.03 K/CU MM    Immature Neutrophil % 0.4 0 - 0.43 %   CMP   Result Value Ref Range    Sodium 140 135 - 145 MMOL/L    Potassium 3.7 3.5 - 5.1 MMOL/L    Chloride 102 99 - 110 mMol/L    CO2 21 21 - 32 MMOL/L    BUN 8 6 - 23 MG/DL    CREATININE 0.8 0.6 - 1.1 MG/DL    Glucose 96 70 - 99 MG/DL    Calcium 9.4 8.3 - 10.6 MG/DL    Alb 5.0 3.4 - 5.0 GM/DL    Total Protein 8.3 (H) 6.4 - 8.2 GM/DL    Total Bilirubin 0.2 0.0 - 1.0 MG/DL    ALT 24 10 - 40 U/L    AST 25 15 - 37 IU/L Alkaline Phosphatase 63 40 - 129 IU/L    GFR Non-African American >60 >60 mL/min/1.73m2    GFR African American >60 >60 mL/min/1.73m2    Anion Gap 17 (H) 4 - 16   TSH without Reflex   Result Value Ref Range    TSH, High Sensitivity 1.000 0.270 - 4.20 uIu/ml   Urine Drug Screen   Result Value Ref Range    Cannabinoid Scrn, Ur NEGATIVE NEGATIVE    Amphetamines NEGATIVE NEGATIVE    Cocaine Metabolite NEGATIVE NEGATIVE    Benzodiazepine Screen, Urine NEGATIVE NEGATIVE    Barbiturate Screen, Ur NEGATIVE NEGATIVE    Opiates, Urine NEGATIVE NEGATIVE    Phencyclidine, Urine NEGATIVE NEGATIVE    Oxycodone NEGATIVE NEGATIVE       Emergency department course:  Patient was initially seen by Dr. Evelyn Epstein. Initial report is provided at shift change at approximately 0055. Patient presents to the emergency department after making suicidal statements. Patient was \"pink slipped\" by local law enforcement and brought to the emergency department for crisis evaluation. Patient has been resting comfortably in the emergency department. Initial blood alcohol is 0.25. Patient will need to achieve sobriety prior to crisis evaluation. Repeat blood alcohol is ordered for 0630. Care is signed over at shift change. Any addenda will be made as appropriate. Disposition:  Pending.         Deep Isaac MD  12/08/20 1094

## 2020-12-08 NOTE — ED NOTES
Pt is asleep at this time. No distress noted. Sitter at the bedside.       Maynor Lewis RN  12/08/20 2823

## 2020-12-08 NOTE — ED NOTES
Pt given snacks at this time. Pt admits that drinking makes her more sad and depressed and that she needs to quit drinking, especially while taking her medications. Pt states she loves her mom very much and just wishes they were closer. She does believe her mom loves her. Sitter in room at computer.      Gabriela Saavedra RN  12/08/20 6590

## 2020-12-08 NOTE — ED NOTES
Pt is asleep at this time. No distress noted. Sitter at the bedside.       Mayank Sadler RN  12/08/20 7525

## 2020-12-08 NOTE — ED NOTES
Pt in bed sleeping at this time. Sitter in room at computer.      Augustine Whiteside RN  12/08/20 5066

## 2020-12-08 NOTE — ED NOTES
Pt is in bed talking to the sitter.    This nurse sent the urine down to lab     Brendan Hogue RN  12/07/20 9282

## 2020-12-08 NOTE — ED NOTES
Pt is asleep at this time. No distress noted. Sitter at the bedside.       Cecelia Valenzuela RN  12/08/20 0082

## 2020-12-08 NOTE — ED NOTES
Patient speaking with Kacy [address] on the phone at this time.     Alana Chicas RDMS, RVT  12/08/2020 3679

## 2020-12-08 NOTE — ED PROVIDER NOTES
Respirations unlabored. CTAB  ABDOMEN: Soft. Non-tender. No guarding or rebound. EXTREMITIES: No acute deformities. No edema. SKIN: Warm and dry. NEUROLOGICAL: No gross facial drooping. Moves all 4 extremities spontaneously. PSYCHIATRIC: Tearful, anxious, upset. Currently denying suicidal ideation    I have reviewed and interpreted all of the currently available lab results from this visit (if applicable):  Results for orders placed or performed during the hospital encounter of 12/07/20   Ethanol Level   Result Value Ref Range    Alcohol Scrn 0.25 (H) <0.01 %WT/VOL   Acetaminophen Level   Result Value Ref Range    Acetaminophen Level <5.0 (L) 15 - 30 ug/ml    DOSE AMOUNT DOSE AMT. GIVEN - UNKNOWN     DOSE TIME DOSE TIME GIVEN - UNKNOWN    Salicylate Level   Result Value Ref Range    Salicylate Lvl <9.8 (L) 15 - 30 MG/DL    DOSE AMOUNT DOSE AMT.  GIVEN - UNKNOWN     DOSE TIME DOSE TIME GIVEN - UNKNOWN    HCG Serum, Qualitative   Result Value Ref Range    hCG Qual NEGATIVE    CBC with Auto Diff   Result Value Ref Range    WBC 7.7 4.0 - 10.5 K/CU MM    RBC 5.51 (H) 4.2 - 5.4 M/CU MM    Hemoglobin 15.5 12.5 - 16.0 GM/DL    Hematocrit 47.2 (H) 37 - 47 %    MCV 85.7 78 - 100 FL    MCH 28.1 27 - 31 PG    MCHC 32.8 32.0 - 36.0 %    RDW 13.2 11.7 - 14.9 %    Platelets 643 368 - 908 K/CU MM    MPV 8.3 7.5 - 11.1 FL    Differential Type AUTOMATED DIFFERENTIAL     Segs Relative 65.9 36 - 66 %    Lymphocytes % 26.0 24 - 44 %    Monocytes % 6.5 (H) 0 - 4 %    Eosinophils % 0.8 0 - 3 %    Basophils % 0.4 0 - 1 %    Segs Absolute 5.0 K/CU MM    Lymphocytes Absolute 2.0 K/CU MM    Monocytes Absolute 0.5 K/CU MM    Eosinophils Absolute 0.1 K/CU MM    Basophils Absolute 0.0 K/CU MM    Nucleated RBC % 0.0 %    Total Nucleated RBC 0.0 K/CU MM    Total Immature Neutrophil 0.03 K/CU MM    Immature Neutrophil % 0.4 0 - 0.43 %   CMP   Result Value Ref Range    Sodium 140 135 - 145 MMOL/L    Potassium 3.7 3.5 - 5.1 MMOL/L    Chloride 102 99 - 110 mMol/L    CO2 21 21 - 32 MMOL/L    BUN 8 6 - 23 MG/DL    CREATININE 0.8 0.6 - 1.1 MG/DL    Glucose 96 70 - 99 MG/DL    Calcium 9.4 8.3 - 10.6 MG/DL    Alb 5.0 3.4 - 5.0 GM/DL    Total Protein 8.3 (H) 6.4 - 8.2 GM/DL    Total Bilirubin 0.2 0.0 - 1.0 MG/DL    ALT 24 10 - 40 U/L    AST 25 15 - 37 IU/L    Alkaline Phosphatase 63 40 - 129 IU/L    GFR Non-African American >60 >60 mL/min/1.73m2    GFR African American >60 >60 mL/min/1.73m2    Anion Gap 17 (H) 4 - 16   TSH without Reflex   Result Value Ref Range    TSH, High Sensitivity 1.000 0.270 - 4.20 uIu/ml   Urine Drug Screen   Result Value Ref Range    Cannabinoid Scrn, Ur NEGATIVE NEGATIVE    Amphetamines NEGATIVE NEGATIVE    Cocaine Metabolite NEGATIVE NEGATIVE    Benzodiazepine Screen, Urine NEGATIVE NEGATIVE    Barbiturate Screen, Ur NEGATIVE NEGATIVE    Opiates, Urine NEGATIVE NEGATIVE    Phencyclidine, Urine NEGATIVE NEGATIVE    Oxycodone NEGATIVE NEGATIVE        Radiographs:  [] Radiologist's Wet Read Report Reviewed:     [] Discussed with Radiologist:     [] The following radiograph was interpreted by myself in the absence of a radiologist:     EKG: (All EKG's are interpreted by myself in the absence of a cardiologist)      MDM:  Patient is tachycardic and hypertensive. Anxious. Tearful. Crying. Afebrile. Sats are 100% on room air. She is drinking water. Placed on suicide precautions. Pink slipped by the police. CBC shows a white count of 7.7. Hemoglobin of 15.5. Electrolytes normal.  Tylenol and salicylate negative. Alcohol level elevated at 0.25. Pregnancy normal. TSH normal. UDS negative. Repeat ETOH ordered to be drawn at 6:30am.  Patient will be signed out to oncoming provider as her alcohol level will not be repeated until 6:30 in the morning. She will then need mental health evaluation. 12:07 AM EST I have signed out [facility] Emergency Department care to Dr. Allen Hernandez.  We discussed the history, physical exam, completed/pending test results (if obtained) and current treatment plan. Please refer to his/her chart for the patients further details, remaining Emergency Department course, final disposition and diagnosis. Clinical Impression:  1. Acute alcoholic intoxication without complication ([address]    2. Suicidal ideation        Disposition Vitals:  [unfilled], [unfilled], [unfilled], [unfilled]    Disposition referral (if applicable):  No follow-up provider specified. Disposition medications (if applicable):  New Prescriptions    No medications on file         (Please note that portions of this note may have been completed with a voice recognition program. Efforts were made to edit the dictations but occasionally words are mis-transcribed.)    MD Shanelle Giordano MD  12/08/20 0008      3:21 PM TAVIA San was checked out to me by Dr. Kathrine Velasquez for mental health evaluation. She is now clinically sober. Please see his/her initial documentation for details of the patient's ED presentation, physical exam and completed studies. 7:15pm- Patient accepted at [facility] mental health, Dr. Oskar Spencer. PO ativan given as patient is tearful, anxious. Awaiting transport.           Shanelle Diego MD  12/08/20 0312

## 2020-12-08 NOTE — ED PROVIDER NOTES
17-year-old female presents for alcohol intoxication as well as suicidal ideation. She reportedly made concerning statements to family member that she want to kill her self by overdose. Does have a family history of suicide. She presents for evaluation. Patient was signed out to me by previous physician. Labs do show an elevated alcohol level of 0.25. Labs otherwise unremarkable. Repeat alcohol level will be drawn. Once her alcohol level is normalized, she will be evaluated by mental crisis team.  Final disposition be pending mental health crisis team recommendation. Repeat alcohol level was drawn and came back at 0.08. At this point, she may be evaluated by mental crisis team.  She is deemed medically clear. She will be evaluated by mental crisis team.  Final disposition be pending their recommendation.     Priscila Lemons  12/8/2020  6:54 AM       Priscila Lemons MD  12/08/20 2022

## 2020-12-09 LAB
CHOLESTEROL: 157 MG/DL
HDLC SERPL-MCNC: 63 MG/DL
LDL CHOLESTEROL DIRECT: 86 MG/DL
TRIGL SERPL-MCNC: 80 MG/DL

## 2020-12-09 NOTE — ED NOTES
Spoke with Consolidated Juan from [facility]. Pt has been accepted by Dr. Pedro Arzola after recent Lab drawal and covid test completet along with updated vital Signs.      Gunnar Olmos RN  12/08/20 3451

## 2020-12-10 ENCOUNTER — CARE COORDINATION (OUTPATIENT)
Dept: OTHER | Facility: CLINIC | Age: 20
End: 2020-12-10

## 2020-12-10 NOTE — CARE COORDINATION
Patient's chart has been reviewed for Care Coordination needs.   Will continue to follow patient and outreach upon discharge from her current program.

## 2020-12-22 ENCOUNTER — CARE COORDINATION (OUTPATIENT)
Dept: OTHER | Facility: CLINIC | Age: 20
End: 2020-12-22

## 2020-12-22 NOTE — CARE COORDINATION
Telephonic outreach to patient. Unsure of discharge status, as patient transferred to a non [facility]. Left a discreet  VM with the patient's mobile cell number. Will continue attempts to reach the patient for care management services.

## 2020-12-24 ENCOUNTER — CARE COORDINATION (OUTPATIENT)
Dept: OTHER | Facility: CLINIC | Age: 20
End: 2020-12-24

## 2020-12-24 NOTE — LETTER
December 24, 2020    [address] [facility]    Dear Ms. Velvet Sunshine,       My name is Nathan Wilde RN, Associate Care Manager for [facility] and I have been trying to reach you. The Associate Care Management (ACM) program is a free-of-charge confidential service provided to our employees and their family members covered by the [address] [facility]. The program will provide an associate and his/her family with the [employer]'s expertise to assist in navigating the health care delivery system, provider services, and their overall care needsso as to assure and improve health care interactions and enhance the quality of life. This program is designed to provide you with the opportunity to have a [facility] partner with you for the following services:     1) when you come home from the hospital or emergency room   2) when help is needed to manage your disease   3) when you need assistance coordinating services or appointments    [facility] is dedicated to empowering the good health of its community and improving the quality of care and care experiences for employees and their families. We are committed to safeguarding patient confidentiality and privacy, assuring that every employee has the respect he or she deserves in managing their health. The information shared with your care manager will not be shared with anyone else aside from those you identify as part of your care team, and will only be used to assist you with any identified care needs. Please contact me if you would like this service provided to you.      Sincerely,    Nathan Wilde RN   Associate Care Manager   [phone]  [email or web address]

## 2020-12-24 NOTE — CARE COORDINATION
ACM attempted to reach patient for introduction to Associate Care Management. HIPAA compliant message left requesting a return phone call at patients convenience. [employer] Message/ Introduction Letter also sent. Will continue to outreach patient.

## 2021-01-05 ENCOUNTER — CARE COORDINATION (OUTPATIENT)
Dept: OTHER | Facility: CLINIC | Age: 21
End: 2021-01-05

## 2021-01-22 ENCOUNTER — CARE COORDINATION (OUTPATIENT)
Dept: OTHER | Facility: CLINIC | Age: 21
End: 2021-01-22

## 2021-01-22 NOTE — CARE COORDINATION
Ambulatory Care Coordination Note  1/22/2021  CM Risk Score: 5  Charlson 10 Year Mortality Risk Score: 2%     ACC: Judah Clement RN    Summary Note: Continued outreach attempts to introduce associate care management. Left a VM for both patient and mother. Will attempt an additional outreach before closing episode. Goals Addressed    None         Prior to Admission medications    Medication Sig Start Date End Date Taking? Authorizing Provider   levothyroxine (SYNTHROID) 75 MCG tablet Take 75 mcg by mouth Daily    Historical Provider, MD   buPROPion HCl (WELLBUTRIN SR PO) Take by mouth daily    Historical Provider, MD   busPIRone (BUSPAR) 10 MG tablet Take 10 mg by mouth 2 times daily    Historical Provider, MD   HYDROXYZINE HCL PO Take by mouth 3 times daily    Historical Provider, MD   ibuprofen (IBU) 400 MG tablet Take 1 tablet by mouth every 6 hours as needed for Pain 11/19/20   Latrell Her MD   ondansetron [facility]) 4 MG tablet Take 1 tablet by mouth daily as needed for Nausea or Vomiting 11/19/20   Latrell Her MD   ondansetron (ZOFRAN ODT) 4 MG disintegrating tablet Take 1 tablet by mouth every 8 hours as needed for Nausea or Vomiting 8/5/17   Latrell Her MD       No future appointments.

## 2021-01-28 ENCOUNTER — CARE COORDINATION (OUTPATIENT)
Dept: OTHER | Facility: CLINIC | Age: 21
End: 2021-01-28

## 2021-01-28 NOTE — CARE COORDINATION
Ambulatory Care Coordination Note  1/28/2021  CM Risk Score: 5  Charlson 10 Year Mortality Risk Score: 2%     ACC: Marie Leung, RN    Summary Note: [address] [facility] ([facility]) contacted the patient by telephone to [facility] attempted third and final call to patient for introduction to Associate Care Management. Patient has not responded to previous attempts to contact. Final unable to contact letter sent. [facility] will sign off, if no return call. Goals Addressed    None         Prior to Admission medications    Medication Sig Start Date End Date Taking? Authorizing Provider   levothyroxine (SYNTHROID) 75 MCG tablet Take 75 mcg by mouth Daily    Historical Provider, MD   buPROPion HCl (WELLBUTRIN SR PO) Take by mouth daily    Historical Provider, MD   busPIRone (BUSPAR) 10 MG tablet Take 10 mg by mouth 2 times daily    Historical Provider, MD   HYDROXYZINE HCL PO Take by mouth 3 times daily    Historical Provider, MD   ibuprofen (IBU) 400 MG tablet Take 1 tablet by mouth every 6 hours as needed for Pain 11/19/20   Misty Shi MD   ondansetron [facility]) 4 MG tablet Take 1 tablet by mouth daily as needed for Nausea or Vomiting 11/19/20   Misty Shi MD   ondansetron (ZOFRAN ODT) 4 MG disintegrating tablet Take 1 tablet by mouth every 8 hours as needed for Nausea or Vomiting 8/5/17   Misty Shi MD       No future appointments.

## 2022-07-05 ENCOUNTER — APPOINTMENT (OUTPATIENT)
Dept: GENERAL RADIOLOGY | Age: 22
End: 2022-07-05
Payer: COMMERCIAL

## 2022-07-05 ENCOUNTER — HOSPITAL ENCOUNTER (EMERGENCY)
Age: 22
Discharge: HOME | End: 2022-07-05
Attending: EMERGENCY MEDICINE
Payer: COMMERCIAL

## 2022-07-05 VITALS
OXYGEN SATURATION: 99 % | TEMPERATURE: 98.7 F | DIASTOLIC BLOOD PRESSURE: 94 MMHG | HEART RATE: 98 BPM | RESPIRATION RATE: 14 BRPM | BODY MASS INDEX: 30.12 KG/M2 | HEIGHT: 63 IN | SYSTOLIC BLOOD PRESSURE: 144 MMHG | WEIGHT: 170 LBS

## 2022-07-05 DIAGNOSIS — S63.611A SPRAIN OF LEFT INDEX FINGER, UNSPECIFIED SITE OF DIGIT, INITIAL ENCOUNTER: Primary | ICD-10-CM

## 2022-07-05 DIAGNOSIS — J02.9 SORE THROAT: ICD-10-CM

## 2022-07-05 DIAGNOSIS — R09.82 POST-NASAL DRIP: ICD-10-CM

## 2022-07-05 PROCEDURE — 73130 X-RAY EXAM OF HAND: CPT

## 2022-07-05 PROCEDURE — 99283 EMERGENCY DEPT VISIT LOW MDM: CPT

## 2022-07-05 RX ORDER — TRAMADOL HYDROCHLORIDE 50 MG/1
50 TABLET ORAL EVERY 6 HOURS PRN
Qty: 15 TABLET | Refills: 0 | Status: SHIPPED | OUTPATIENT
Start: 2022-07-05 | End: 2022-07-10

## 2022-07-05 ASSESSMENT — PAIN DESCRIPTION - LOCATION: LOCATION: FINGER (COMMENT WHICH ONE)

## 2022-07-05 ASSESSMENT — PAIN DESCRIPTION - PAIN TYPE: TYPE: ACUTE PAIN

## 2022-07-05 ASSESSMENT — PAIN SCALES - GENERAL: PAINLEVEL_OUTOF10: 7

## 2022-07-05 ASSESSMENT — PAIN DESCRIPTION - DESCRIPTORS: DESCRIPTORS: POUNDING

## 2022-07-05 ASSESSMENT — PAIN DESCRIPTION - FREQUENCY: FREQUENCY: CONTINUOUS

## 2022-07-05 ASSESSMENT — PAIN - FUNCTIONAL ASSESSMENT: PAIN_FUNCTIONAL_ASSESSMENT: 0-10

## 2022-07-05 NOTE — ED TRIAGE NOTES
Pt landed on finger while playing on a slip n slide. Said swelling and pain is worse today. Pt stated Motrin hasn't alleviated the pain.

## 2022-07-05 NOTE — ED PROVIDER NOTES
EMERGENCY DEPARTMENT ENCOUNTER      CHIEF COMPLAINT:   Finger pain (left index)    HPI: Rafy Steele is a 23 y.o. female who presents to the Emergency Department complaining of pain and swelling to her left index finger. The patient states that she was playing on a slip and slide yesterday and injured her finger. It is become progressively more swollen and painful today. She states it is achy and pounding. She rates it as 7 out of 10 pain. She has difficulty bending it. The pain is worse with movement and better with rest.  She denies any associated numbness or tingling. The patient also complains of a sore throat. She states that is been intermittent for the past few weeks. She is able to eat and drink. The patient denies fevers, chills, chest pain, shortness of breath, abdominal pain, numbness, tingling, weakness, or any other complaints. REVIEW OF SYSTEMS:  CONSTITUTIONAL:  Denies fever, chills, weight loss or weakness  EYES:  Denies photophobia or discharge  ENT: See HPI  CARDIOVASCULAR:  Denies chest pain, palpitations or swelling  RESPIRATORY:  Denies cough or shortness of breath  GI: Denies abdominal pain, nausea, vomiting, or diarrhea  MUSCULOSKELETAL: See HPI  SKIN:  No rash  NEUROLOGIC:  Denies headache, focal weakness or sensory changes  All systems negative except as marked. \"Remaining review of systems reviewed and negative. I have reviewed the nursing triage documentation and agree unless otherwise noted below. \"    PAST MEDICAL HISTORY:   Past Medical History:   Diagnosis Date    Headache(784.0)     Hypertension        CURRENT MEDICATIONS:   Home medications reviewed. SURGICAL HISTORY:   No past surgical history on file. FAMILY HISTORY:   No family history on file.     SOCIAL HISTORY:   Social History     Socioeconomic History    Marital status: Single     Spouse name: Not on file    Number of children: Not on file    Years of education: Not on file    Highest education level: Not on file   Occupational History    Not on file   Tobacco Use    Smoking status: Never Smoker    Smokeless tobacco: Not on file   Substance and Sexual Activity    Alcohol use: Yes     Comment: occ    Drug use: Yes     Types: Marijuana Alma Postin)    Sexual activity: Never   Other Topics Concern    Not on file   Social History Narrative    Not on file     Social Determinants of Health     Financial Resource Strain:     Difficulty of Paying Living Expenses: Not on file   Food Insecurity:     Worried About Running Out of Food in the Last Year: Not on file    Vivienne of Food in the Last Year: Not on file   Transportation Needs:     Lack of Transportation (Medical): Not on file    Lack of Transportation (Non-Medical):  Not on file   Physical Activity:     Days of Exercise per Week: Not on file    Minutes of Exercise per Session: Not on file   Stress:     Feeling of Stress : Not on file   Social Connections:     Frequency of Communication with Friends and Family: Not on file    Frequency of Social Gatherings with Friends and Family: Not on file    Attends Yazidi Services: Not on file    Active Member of [address] [place] [address] or Organizations: Not on file    Attends Club or Organization Meetings: Not on file    Marital Status: Not on file   Intimate Partner Violence:     Fear of Current or Ex-Partner: Not on file    Emotionally Abused: Not on file    Physically Abused: Not on file    Sexually Abused: Not on file   Housing Stability:     Unable to Pay for Housing in the Last Year: Not on file    Number of [place] in the Last Year: Not on file    Unstable Housing in the Last Year: Not on file       ALLERGIES: Lorabid [loracarbef]    PHYSICAL EXAM:  VITAL SIGNS:   ED Triage Vitals [07/05/22 1500]   Enc Vitals Group      BP (!) 144/94      Heart Rate 98      Resp 14      Temp 98.7 °F (37.1 °C)      Temp src       SpO2 99 %      Weight 170 lb (77.1 kg)      Height 5' 3\" (1.6 m)      Head Circumference Peak Flow       Pain Score       Pain Loc       Pain Edu? Excl. in [address]? Constitutional:  Non-toxic appearance  HENT: Normocephalic, Atraumatic, there is erythema and cobblestoning noted to the posterior pharynx with postnasal drip noted, no exudates, no tonsillar hypertrophy  Eyes: PERRL, conjunctiva normal   Neck: Normal range of motion, No tenderness, Supple, No stridor, No lymphadenopathy  Cardiovascular:  Normal heart rate, Normal rhythm  Pulmonary/Chest:  Normal breath sounds, No respiratory distress, No wheezing  Abdomen: Bowel sounds normal, Soft, No tenderness, No masses, No pulsatile masses  Extremities: There is swelling and tenderness to palpation over the proximal portion of the left index finger, there is decreased range of motion due to pain and swelling, there is no deformity, the peripheral pulses are strong, Capillary refill is brisk, there is normal motor and sensory function, the hand is pink, warm, and well perfused, there is no cyanosis  Skin:  Warm, Dry, No erythema, No rash      EKG:    None    Radiology / Procedures:  XR HAND LEFT (MIN 3 VIEWS) (Final result)  Result time 07/05/22 15:47:31  Final result by Ayden Ruiz (07/05/22 15:47:31)                Impression:    Soft tissue swelling of the proximal aspect of the index finger without   radiographic evidence of acute fracture or dislocation seen. RECOMMENDATION:   If there is a clinical concern for occult fracture, consider follow-up   examination in 7-10 days. Narrative:    EXAMINATION:   THREE XRAY VIEWS OF THE LEFT HAND     7/5/2022 3:18 pm     COMPARISON:   None. HISTORY:   ORDERING SYSTEM PROVIDED HISTORY: Left index finger pain   TECHNOLOGIST PROVIDED HISTORY:   Reason for exam:->Left index finger pain     FINDINGS:   There is soft tissue swelling of the proximal aspect of the index finger.  No   radiographic evidence of acute fracture or dislocation is seen.  No   radiopaque foreign body seen. ED COURSE & MEDICAL DECISION MAKING:  Pertinent Labs & Imaging studies reviewed. (See chart for details)  On exam, the patient is afebrile and nontoxic appearing. She is hemodynamically stable and neurologically intact. X-rays of the left hand are obtained and show soft tissue swelling of the proximal aspect of the index finger. There is no acute fracture or dislocation noted. The patient was placed in an AlumaFoam splint. I suspect that the patient has a left index finger sprain. She also has a sore throat that is likely due to postnasal drip for which I recommend over-the-counter Flonase and Zyrtec. I have a low suspicion for acute fracture, dislocation, compartment syndrome, necrotizing fasciitis, or neurovascular injury. I feel that the patient is stable for outpatient management with follow up in 2-3 days. She is given return precautions. The patient verbalized understanding, was agreeable with plan, and the patient was discharged home in stable condition. Clinical Impression:  1. Sprain of left index finger, unspecified site of digit, initial encounter    2. Post-nasal drip    3. Sore throat        Disposition referral (if applicable):  Emani Comment, APRN - CNP  [address] [facility]  [phone]    Schedule an appointment as soon as possible for a visit       [address]  [phone]  Go to   If symptoms worsen      Disposition medications (if applicable):  Discharge Medication List as of 7/5/2022  4:08 PM      START taking these medications    Details   traMADol (ULTRAM) 50 MG tablet Take 1 tablet by mouth every 6 hours as needed for Pain for up to 5 days. , Disp-15 tablet, R-0Normal               Comment: Please note this report has been produced using speech recognition software and may contain errors related to that system including errors in grammar, punctuation, and spelling, as well as words and phrases that may be inappropriate. If there are any questions or concerns please feel free to contact the dictating provider for clarification.         Natasha Chang MD  07/06/22 2918

## 2022-07-07 ENCOUNTER — CARE COORDINATION (OUTPATIENT)
Dept: OTHER | Facility: CLINIC | Age: 22
End: 2022-07-07

## 2022-07-07 NOTE — CARE COORDINATION
ACM attempted to reach patient for introduction to Associate Care Management related to ED visit on 7/5. HIPAA compliant message left requesting a return phone call. Will attempt to outreach patient again.

## 2022-07-11 ENCOUNTER — CARE COORDINATION (OUTPATIENT)
Dept: OTHER | Facility: CLINIC | Age: 22
End: 2022-07-11

## 2022-07-11 NOTE — CARE COORDINATION
Ambulatory Care Coordination Note  7/11/2022  CM Risk Score: 1  Charlson 10 Year Mortality Risk Score: 2%     ACC: Elaine Beyer, RN    Summary Note: ACM attempted 2nd outreach to reach patient for introduction to Associate Care Management. HIPAA compliant message left requesting a return phone call at patients convenience. Unable to Reach Letter sent to patient via [employer]. Will continue to outreach patient. Lab Results     None              Goals Addressed    None         Prior to Admission medications    Medication Sig Start Date End Date Taking? Authorizing Provider   levothyroxine (SYNTHROID) 75 MCG tablet Take 75 mcg by mouth Daily    Historical Provider, MD   buPROPion HCl (WELLBUTRIN SR PO) Take by mouth daily    Historical Provider, MD   busPIRone (BUSPAR) 10 MG tablet Take 10 mg by mouth 2 times daily    Historical Provider, MD   HYDROXYZINE HCL PO Take by mouth 3 times daily    Historical Provider, MD   ibuprofen (IBU) 400 MG tablet Take 1 tablet by mouth every 6 hours as needed for Pain 11/19/20   Paul Kimball MD   [facility]ron [facility]) 4 MG tablet Take 1 tablet by mouth daily as needed for Nausea or Vomiting 11/19/20   Paul Kimball MD   ondansetron (ZOFRAN ODT) 4 MG disintegrating tablet Take 1 tablet by mouth every 8 hours as needed for Nausea or Vomiting 8/5/17   Paul Kimball MD       No future appointments.

## 2022-07-11 NOTE — LETTER
July 11, 2022    Dear Dary Cisse,    My name is Brandon Mahan, RN, Associate Care Manager for [facility] and I have been trying to reach you. Associate Care Management (ACM)  is a free-of-charge confidential service provided to our employees and their family members covered by the [address] [place] [address]. The program will provide an associate and his/her family with the [address]'s expertise to assist in navigating the health care delivery system, provider services, and their overall care needsso as to assure and improve health care interactions and enhance the quality of life. This service is designed to provide you with the opportunity to have a [facility] partner with you for the following services:     1) when help is needed to manage your disease   2) when you come home from the hospital or emergency room   3) when you need assistance coordinating services or appointments    [address] [place] [address] is dedicated to empowering the good health of its community and improving the quality of care and care experiences for employees and their families. We are committed to safeguarding patient confidentiality and privacy, assuring that every employee has the respect he or she deserves in managing their health. The information shared with your care manager will not be shared with anyone else aside from those you identify as part of your care team, and will only be used to assist you with any identified care needs. You can reach me at my direct line [phone] or through [address]        In good health,    Susanna Chanel, [address]  [address]  Cell [phone] Fax [email or web address]  [facility] [employer] [email or web address]

## 2022-08-09 ENCOUNTER — CARE COORDINATION (OUTPATIENT)
Dept: OTHER | Facility: CLINIC | Age: 22
End: 2022-08-09

## 2022-11-27 ENCOUNTER — HOSPITAL ENCOUNTER (EMERGENCY)
Age: 22
Discharge: HOME | End: 2022-11-27
Attending: EMERGENCY MEDICINE
Payer: COMMERCIAL

## 2022-11-27 VITALS
TEMPERATURE: 100.3 F | OXYGEN SATURATION: 97 % | HEART RATE: 112 BPM | BODY MASS INDEX: 29.19 KG/M2 | DIASTOLIC BLOOD PRESSURE: 78 MMHG | RESPIRATION RATE: 16 BRPM | SYSTOLIC BLOOD PRESSURE: 129 MMHG | WEIGHT: 164.8 LBS

## 2022-11-27 DIAGNOSIS — M79.10 MYALGIA: ICD-10-CM

## 2022-11-27 DIAGNOSIS — R50.9 FEVER, UNSPECIFIED FEVER CAUSE: ICD-10-CM

## 2022-11-27 DIAGNOSIS — R68.83 CHILLS: ICD-10-CM

## 2022-11-27 DIAGNOSIS — R11.2 NAUSEA AND VOMITING, UNSPECIFIED VOMITING TYPE: Primary | ICD-10-CM

## 2022-11-27 LAB
BACTERIA: NEGATIVE /HPF
BILIRUBIN URINE: NEGATIVE MG/DL
BLOOD, URINE: ABNORMAL
CAST TYPE: NORMAL /HPF
CLARITY: CLEAR
COLOR: YELLOW
CRYSTAL TYPE: NEGATIVE /HPF
EPITHELIAL CELLS, UA: 7 /HPF
GLUCOSE, URINE: NEGATIVE MG/DL
INTERPRETATION: NORMAL
KETONES, URINE: ABNORMAL MG/DL
LEUKOCYTE ESTERASE, URINE: NEGATIVE
NITRITE URINE, QUANTITATIVE: NEGATIVE
PH, URINE: 6 (ref 5–8)
PREGNANCY, URINE: NEGATIVE
PROTEIN UA: ABNORMAL MG/DL
RAPID INFLUENZA  B AGN: NEGATIVE
RAPID INFLUENZA A AGN: NEGATIVE
RBC URINE: 1 /HPF (ref 0–6)
SARS-COV-2, NAAT: NOT DETECTED
SOURCE: NORMAL
SPECIFIC GRAVITY UA: >1.03 (ref 1–1.03)
SPECIFIC GRAVITY, URINE: >1.03 (ref 1–1.03)
UROBILINOGEN, URINE: 0.2 MG/DL (ref 0.2–1)
WBC UA: <1 /HPF (ref 0–5)

## 2022-11-27 PROCEDURE — 2500000003 HC RX 250 WO HCPCS

## 2022-11-27 PROCEDURE — 87635 SARS-COV-2 COVID-19 AMP PRB: CPT

## 2022-11-27 PROCEDURE — 96374 THER/PROPH/DIAG INJ IV PUSH: CPT

## 2022-11-27 PROCEDURE — 6360000002 HC RX W HCPCS: Performed by: EMERGENCY MEDICINE

## 2022-11-27 PROCEDURE — 99284 EMERGENCY DEPT VISIT MOD MDM: CPT

## 2022-11-27 PROCEDURE — 2580000003 HC RX 258: Performed by: EMERGENCY MEDICINE

## 2022-11-27 PROCEDURE — 96361 HYDRATE IV INFUSION ADD-ON: CPT

## 2022-11-27 PROCEDURE — 6370000000 HC RX 637 (ALT 250 FOR IP): Performed by: EMERGENCY MEDICINE

## 2022-11-27 PROCEDURE — 81025 URINE PREGNANCY TEST: CPT

## 2022-11-27 PROCEDURE — 81001 URINALYSIS AUTO W/SCOPE: CPT

## 2022-11-27 PROCEDURE — 87804 INFLUENZA ASSAY W/OPTIC: CPT

## 2022-11-27 RX ORDER — ONDANSETRON 4 MG/1
4 TABLET, ORALLY DISINTEGRATING ORAL 3 TIMES DAILY PRN
Qty: 21 TABLET | Refills: 0 | Status: SHIPPED | OUTPATIENT
Start: 2022-11-27

## 2022-11-27 RX ORDER — ACETAMINOPHEN 500 MG
1000 TABLET ORAL ONCE
Status: COMPLETED | OUTPATIENT
Start: 2022-11-27 | End: 2022-11-27

## 2022-11-27 RX ORDER — PROMETHAZINE HYDROCHLORIDE 25 MG/1
25 TABLET ORAL ONCE
Status: COMPLETED | OUTPATIENT
Start: 2022-11-27 | End: 2022-11-27

## 2022-11-27 RX ORDER — 0.9 % SODIUM CHLORIDE 0.9 %
1000 INTRAVENOUS SOLUTION INTRAVENOUS ONCE
Status: COMPLETED | OUTPATIENT
Start: 2022-11-27 | End: 2022-11-27

## 2022-11-27 RX ORDER — PROMETHAZINE HYDROCHLORIDE 25 MG/1
25 TABLET ORAL 4 TIMES DAILY PRN
Qty: 20 TABLET | Refills: 0 | Status: SHIPPED | OUTPATIENT
Start: 2022-11-27 | End: 2022-12-04

## 2022-11-27 RX ORDER — FAMOTIDINE 10 MG/ML
INJECTION, SOLUTION INTRAVENOUS
Status: COMPLETED
Start: 2022-11-27 | End: 2022-11-27

## 2022-11-27 RX ORDER — ONDANSETRON 2 MG/ML
8 INJECTION INTRAMUSCULAR; INTRAVENOUS ONCE
Status: COMPLETED | OUTPATIENT
Start: 2022-11-27 | End: 2022-11-27

## 2022-11-27 RX ADMIN — PROMETHAZINE HYDROCHLORIDE 25 MG: 25 TABLET ORAL at 21:04

## 2022-11-27 RX ADMIN — ONDANSETRON 8 MG: 2 INJECTION INTRAMUSCULAR; INTRAVENOUS at 20:11

## 2022-11-27 RX ADMIN — SODIUM CHLORIDE 1000 ML: 9 INJECTION, SOLUTION INTRAVENOUS at 20:08

## 2022-11-27 RX ADMIN — FAMOTIDINE 20 MG: 10 INJECTION INTRAVENOUS at 20:12

## 2022-11-27 RX ADMIN — ACETAMINOPHEN 1000 MG: 500 TABLET ORAL at 20:13

## 2022-11-28 NOTE — ED PROVIDER NOTES
CHIEF COMPLAINT    Chief Complaint   Patient presents with    Emesis    Abdominal Pain     HPI  Costa Jones is a 23 y.o. female who presents to the ED with complaints of nausea, vomiting, fevers, chills, myalgias. Patient states that she was out with friends yesterday evening consuming alcohol and around 3 AM began to experience persistent nausea and vomiting. This was followed by diffuse body myalgias and fevers. She has attempted use Zofran and Phenergan throughout the day with some mild improvement but continues to have significant nausea and vomiting as well as fevers. She has diffuse body myalgias worst in the low back. The myalgias are rated as moderate to severe in severity. Has been unable to tolerate NSAIDs or Tylenol secondary to pain. Denies abdominal pain, chest pain, dizziness, lightheadedness. REVIEW OF SYSTEMS  Constitutional: Complains of fevers and chills  Eye: No visual changes  HENT: No earache or sore throat. Resp: No SOB or productive cough. Cardio: No chest pain or palpitations. GI: No abdominal pain,constipation or diarrhea. No melena. Complains of nausea and vomiting  : No dysuria, urgency or frequency. Endocrine: No heat intolerance, no cold intolerance, no polydipsia   Lymphatics: No adenopathy  Musculoskeletal: Complains of myalgias  Neuro: No headaches. Psych: No homicidal or suicidal thoughts  Skin: No rash, No itching. ?  ? PAST MEDICAL HISTORY  Past Medical History:   Diagnosis Date    Headache(784.0)     Hypertension      FAMILY HISTORY  History reviewed. No pertinent family history.   SOCIAL HISTORY  Social History     Socioeconomic History    Marital status: Single     Spouse name: None    Number of children: None    Years of education: None    Highest education level: None   Tobacco Use    Smoking status: Never   Substance and Sexual Activity    Alcohol use: Yes     Comment: occ    Drug use: Yes     Types: Marijuana Leonor Otero    Sexual activity: Never SURGICAL HISTORY  History reviewed. No pertinent surgical history. CURRENT MEDICATIONS  Discharge Medication List as of 11/27/2022  8:56 PM        CONTINUE these medications which have NOT CHANGED    Details   levothyroxine (SYNTHROID) 75 MCG tablet Take 75 mcg by mouth DailyHistorical Med      buPROPion HCl (WELLBUTRIN SR PO) Take by mouth dailyHistorical Med      busPIRone (BUSPAR) 10 MG tablet Take 10 mg by mouth 2 times dailyHistorical Med      HYDROXYZINE HCL PO Take by mouth 3 times dailyHistorical Med      ibuprofen (IBU) 400 MG tablet Take 1 tablet by mouth every 6 hours as needed for Pain, Disp-120 tablet,R-0Print      ondansetron (ZOFRAN) 4 MG tablet Take 1 tablet by mouth daily as needed for Nausea or Vomiting, Disp-30 tablet,R-0Print      !! ondansetron (ZOFRAN ODT) 4 MG disintegrating tablet Take 1 tablet by mouth every 8 hours as needed for Nausea or Vomiting, Disp-30 tablet, R-0Print       !! - Potential duplicate medications found. Please discuss with provider. ALLERGIES  Allergies   Allergen Reactions    Emma [Loracarbef]        Nursing notes reviewed by myself for past medical history, family history, social history, surgical history, current medications, and allergies. PHYSICAL EXAM  VITAL SIGNS: Triage VS:    ED Triage Vitals   Enc Vitals Group      BP       Pulse       Resp       Temp       Temp src       SpO2       Weight       Height       Head Circumference       Peak Flow       Pain Score       Pain Loc       Pain Edu? Excl. in [address]? Constitutional: Well developed, Well nourished, nontoxic appearing, appears uncomfortable secondary to pain  HENT: Normocephalic, Atraumatic, Bilateral external ears normal, dry mucous membranes, No oral exudates, Nose normal.   Eyes: PERRL, EOMI, Conjunctiva normal, No discharge. No scleral icterus. Neck: Normal range of motion, No tenderness, Supple. No meningismus  Lymphatic: No lymphadenopathy noted.    Cardiovascular: Tachycardic, normal rhythm, No murmurs, gallops or rubs. Thorax & Lungs: Normal breath sounds, No respiratory distress, No wheezing. Abdomen: Soft, No tenderness, No masses, No pulsatile masses, No distention, Normal bowel sounds  Skin: Warm, Dry, Pink, No mottling, No erythema, No rash. Back: No midline tenderness, tenderness palpation bilateral paralumbar musculature  Extremities: No edema, No tenderness, No cyanosis, Normal perfusion, No clubbing. Musculoskeletal: Good range of motion in all major joints as observed. No major deformities noted. Neurologic: Alert & oriented x 3, No focal deficits noted. Psychiatric: Affect normal, Judgment normal, Mood normal.     RADIOLOGY  Labs Reviewed   URINALYSIS - Abnormal; Notable for the following components:       Result Value    Ketones, Urine TRACE (*)     Blood, Urine SMALL NUMBER OR AMOUNT OBSERVED (*)     Protein, UA TRACE (*)     All other components within normal limits   RAPID INFLUENZA A/B ANTIGENS   COVID-19, RAPID   PREGNANCY, URINE   MICROSCOPIC URINALYSIS     I personally reviewed the images. The radiologist's interpretation reveals:  Last Imaging results   No orders to display       MEDS GIVEN IN ED:  Medications   famotidine (PEPCID) 20 mg in sodium chloride (PF) 0.9 % 10 mL injection (20 mg IntraVENous Not Given 11/27/22 2012)   0.9 % sodium chloride bolus (0 mLs IntraVENous Stopped 11/27/22 2109)   acetaminophen (TYLENOL) tablet 1,000 mg (1,000 mg Oral Given 11/27/22 2013)   ondansetron (ZOFRAN) injection 8 mg (8 mg IntraVENous Given 11/27/22 2011)   famotidine (PEPCID) 20 MG/2ML injection (20 mg  Given 11/27/22 2012)   promethazine (PHENERGAN) tablet 25 mg (25 mg Oral Given 11/27/22 2104)     COURSE & MEDICAL DECISION MAKING  75-year-old female presents emergency department complaints of fevers, chills, nausea, vomiting, myalgias, low back pain.   Initial vital signs were remarkable for low-grade fever with temperature of 100.3 °F and tachycardia heart of 121. She is nontoxic-appearing on exam but does appear uncomfortable. She has dry mucous membranes. Abdomen is soft and nontender. She has bilateral paralumbar muscular tenderness to palpation. No midline pain. At this time we will obtain urinalysis, COVID-19 testing, and influenza testing. In the interim patient provided with IV fluid bolus, Zofran, Tylenol, and Pepcid. Influenza testing and COVID-19 testing are negative. Urinalysis is without evidence of infection. On reassessment the patient states that she does feel better with respect to her nausea as well as her myalgias. Heart rate has improved. At this time we discussed further work-up given the negative viral testing here versus symptomatic treatment at home and strict return precautions. She and mother discussed this at bedside and have opted for discharge home with antiemetics. She was provided with an oral dose of Phenergan prior to discharge and given a prescription for Zofran and Phenergan. Return precautions provided        Amount and/or Complexity of Data Reviewed  Clinical lab tests: reviewed  Decide to obtain previous medical records or to obtain history from someone other than the patient: yes       -  Patient seen and evaluated in the emergency department. -  Triage and nursing notes reviewed and incorporated. -  Old chart records reviewed and incorporated. -  Work-up included:  See above  -  Results discussed with patient. Appropriate PPE utilized as indicated for entire patient encounter? Time of Disposition: See timeline  ?   Discharge Medication List as of 11/27/2022  8:56 PM        START taking these medications    Details   !! ondansetron (ZOFRAN-ODT) 4 MG disintegrating tablet Take 1 tablet by mouth 3 times daily as needed for Nausea or Vomiting, Disp-21 tablet, R-0Normal      promethazine (PHENERGAN) 25 MG tablet Take 1 tablet by mouth 4 times daily as needed for Nausea, Disp-20 tablet, R-0Normal !! - Potential duplicate medications found. Please discuss with provider. FINAL IMPRESSION  1. Nausea and vomiting, unspecified vomiting type    2. Fever, unspecified fever cause    3. Chills    4. Myalgia        Electronically signed by:  [address], DO, 11/27/2022         [address], DO  11/27/22 9684

## 2022-11-28 NOTE — ED NOTES
Pt verbalized understanding of discharge medications/side effects and follow-up care/instructions, denies any questions or concerns at this time. Prescriptions sent to requested pharmacy; pt encouraged to return to the ED for any new or worsening symptoms.      Kasi Arnett RN  11/27/22 6361

## 2023-06-08 ENCOUNTER — HOSPITAL ENCOUNTER (OUTPATIENT)
Dept: GENERAL RADIOLOGY | Age: 23
Discharge: HOME | End: 2023-06-08

## 2023-06-08 ENCOUNTER — HOSPITAL ENCOUNTER (OUTPATIENT)
Age: 23
Discharge: HOME | End: 2023-06-08

## 2023-06-08 DIAGNOSIS — Z11.1 TUBERCULOSIS SCREENING: ICD-10-CM

## 2023-06-08 PROCEDURE — 71046 X-RAY EXAM CHEST 2 VIEWS: CPT

## 2023-08-03 ENCOUNTER — ANESTHESIA (OUTPATIENT)
Dept: OPERATING ROOM | Age: 23
End: 2023-08-03
Payer: COMMERCIAL

## 2023-08-03 ENCOUNTER — HOSPITAL ENCOUNTER (INPATIENT)
Age: 23
LOS: 2 days | Discharge: HOME | DRG: 446 | End: 2023-08-05
Attending: STUDENT IN AN ORGANIZED HEALTH CARE EDUCATION/TRAINING PROGRAM | Admitting: FAMILY MEDICINE
Payer: COMMERCIAL

## 2023-08-03 ENCOUNTER — APPOINTMENT (OUTPATIENT)
Dept: CT IMAGING | Age: 23
DRG: 446 | End: 2023-08-03
Payer: COMMERCIAL

## 2023-08-03 ENCOUNTER — ANESTHESIA EVENT (OUTPATIENT)
Dept: OPERATING ROOM | Age: 23
End: 2023-08-03
Payer: COMMERCIAL

## 2023-08-03 DIAGNOSIS — N21.9 CALCULUS OF LOWER URINARY TRACT: ICD-10-CM

## 2023-08-03 DIAGNOSIS — N20.0 KIDNEY STONE: ICD-10-CM

## 2023-08-03 DIAGNOSIS — N20.1 URETEROLITHIASIS: Primary | ICD-10-CM

## 2023-08-03 DIAGNOSIS — N39.0 URINARY TRACT INFECTION WITHOUT HEMATURIA, SITE UNSPECIFIED: ICD-10-CM

## 2023-08-03 PROBLEM — N20.9 UROLITHIASIS: Status: ACTIVE | Noted: 2023-08-03

## 2023-08-03 LAB
ALBUMIN SERPL-MCNC: 4.6 GM/DL (ref 3.4–5)
ALP BLD-CCNC: 54 IU/L (ref 40–129)
ALT SERPL-CCNC: 22 U/L (ref 10–40)
ANION GAP SERPL CALCULATED.3IONS-SCNC: 8 MMOL/L (ref 4–16)
AST SERPL-CCNC: 19 IU/L (ref 15–37)
BACTERIA: NEGATIVE /HPF
BASOPHILS ABSOLUTE: 0 K/CU MM
BASOPHILS RELATIVE PERCENT: 0.4 % (ref 0–1)
BILIRUB SERPL-MCNC: 0.4 MG/DL (ref 0–1)
BILIRUBIN URINE: NEGATIVE MG/DL
BLOOD, URINE: NEGATIVE
BUN SERPL-MCNC: 17 MG/DL (ref 6–23)
CALCIUM SERPL-MCNC: 9.1 MG/DL (ref 8.3–10.6)
CHLORIDE BLD-SCNC: 105 MMOL/L (ref 99–110)
CLARITY: CLEAR
CO2: 25 MMOL/L (ref 21–32)
COLOR: ABNORMAL
CREAT SERPL-MCNC: 1.1 MG/DL (ref 0.6–1.1)
DIFFERENTIAL TYPE: ABNORMAL
EOSINOPHILS ABSOLUTE: 0.1 K/CU MM
EOSINOPHILS RELATIVE PERCENT: 0.8 % (ref 0–3)
GFR SERPL CREATININE-BSD FRML MDRD: >60 ML/MIN/1.73M2
GLUCOSE SERPL-MCNC: 71 MG/DL (ref 70–99)
GLUCOSE, URINE: 250 MG/DL
HCG QUALITATIVE: NEGATIVE
HCT VFR BLD CALC: 40.5 % (ref 37–47)
HEMOGLOBIN: 13.2 GM/DL (ref 12.5–16)
IMMATURE NEUTROPHIL %: 0.2 % (ref 0–0.43)
INTERPRETATION: NORMAL
KETONES, URINE: ABNORMAL MG/DL
LEUKOCYTE ESTERASE, URINE: NEGATIVE
LIPASE: 26 IU/L (ref 13–60)
LYMPHOCYTES ABSOLUTE: 1.5 K/CU MM
LYMPHOCYTES RELATIVE PERCENT: 15.4 % (ref 24–44)
MCH RBC QN AUTO: 28.8 PG (ref 27–31)
MCHC RBC AUTO-ENTMCNC: 32.6 % (ref 32–36)
MCV RBC AUTO: 88.2 FL (ref 78–100)
MONOCYTES ABSOLUTE: 1 K/CU MM
MONOCYTES RELATIVE PERCENT: 10.2 % (ref 0–4)
MUCUS: ABNORMAL HPF
NITRITE URINE, QUANTITATIVE: POSITIVE
NUCLEATED RBC %: 0 %
PDW BLD-RTO: 12.7 % (ref 11.7–14.9)
PH, URINE: 6.5 (ref 5–8)
PLATELET # BLD: 372 K/CU MM (ref 140–440)
PMV BLD AUTO: 8.3 FL (ref 7.5–11.1)
POTASSIUM SERPL-SCNC: 4.3 MMOL/L (ref 3.5–5.1)
PREGNANCY, URINE: NEGATIVE
PROTEIN UA: >300 MG/DL
RBC # BLD: 4.59 M/CU MM (ref 4.2–5.4)
RBC URINE: 6 /HPF (ref 0–6)
SEGMENTED NEUTROPHILS ABSOLUTE COUNT: 7.3 K/CU MM
SEGMENTED NEUTROPHILS RELATIVE PERCENT: 73 % (ref 36–66)
SODIUM BLD-SCNC: 138 MMOL/L (ref 135–145)
SPECIFIC GRAVITY UA: 1.02 (ref 1–1.03)
SQUAMOUS EPITHELIAL: 2 /HPF
TOTAL IMMATURE NEUTOROPHIL: 0.02 K/CU MM
TOTAL NUCLEATED RBC: 0 K/CU MM
TOTAL PROTEIN: 6.8 GM/DL (ref 6.4–8.2)
TRICHOMONAS: ABNORMAL /HPF
UROBILINOGEN, URINE: >8 MG/DL (ref 0.2–1)
WBC # BLD: 10 K/CU MM (ref 4–10.5)
WBC UA: 2 /HPF (ref 0–5)

## 2023-08-03 PROCEDURE — 6360000004 HC RX CONTRAST MEDICATION: Performed by: PHYSICIAN ASSISTANT

## 2023-08-03 PROCEDURE — 99285 EMERGENCY DEPT VISIT HI MDM: CPT

## 2023-08-03 PROCEDURE — 81025 URINE PREGNANCY TEST: CPT

## 2023-08-03 PROCEDURE — 2580000003 HC RX 258: Performed by: PHYSICIAN ASSISTANT

## 2023-08-03 PROCEDURE — 96375 TX/PRO/DX INJ NEW DRUG ADDON: CPT

## 2023-08-03 PROCEDURE — 96365 THER/PROPH/DIAG IV INF INIT: CPT

## 2023-08-03 PROCEDURE — 83690 ASSAY OF LIPASE: CPT

## 2023-08-03 PROCEDURE — 81001 URINALYSIS AUTO W/SCOPE: CPT

## 2023-08-03 PROCEDURE — 6370000000 HC RX 637 (ALT 250 FOR IP): Performed by: PHYSICIAN ASSISTANT

## 2023-08-03 PROCEDURE — 80053 COMPREHEN METABOLIC PANEL: CPT

## 2023-08-03 PROCEDURE — 1200000000 HC SEMI PRIVATE

## 2023-08-03 PROCEDURE — 6360000002 HC RX W HCPCS: Performed by: PHYSICIAN ASSISTANT

## 2023-08-03 PROCEDURE — 2580000003 HC RX 258: Performed by: STUDENT IN AN ORGANIZED HEALTH CARE EDUCATION/TRAINING PROGRAM

## 2023-08-03 PROCEDURE — 87086 URINE CULTURE/COLONY COUNT: CPT

## 2023-08-03 PROCEDURE — 85025 COMPLETE CBC W/AUTO DIFF WBC: CPT

## 2023-08-03 PROCEDURE — 74177 CT ABD & PELVIS W/CONTRAST: CPT

## 2023-08-03 PROCEDURE — 6360000002 HC RX W HCPCS: Performed by: STUDENT IN AN ORGANIZED HEALTH CARE EDUCATION/TRAINING PROGRAM

## 2023-08-03 PROCEDURE — 84703 CHORIONIC GONADOTROPIN ASSAY: CPT

## 2023-08-03 RX ORDER — ENOXAPARIN SODIUM 100 MG/ML
40 INJECTION SUBCUTANEOUS EVERY EVENING
Status: DISCONTINUED | OUTPATIENT
Start: 2023-08-03 | End: 2023-08-05 | Stop reason: HOSPADM

## 2023-08-03 RX ORDER — FENTANYL CITRATE 50 UG/ML
25 INJECTION, SOLUTION INTRAMUSCULAR; INTRAVENOUS ONCE
Status: COMPLETED | OUTPATIENT
Start: 2023-08-03 | End: 2023-08-03

## 2023-08-03 RX ORDER — TAMSULOSIN HYDROCHLORIDE 0.4 MG/1
0.4 CAPSULE ORAL DAILY
Status: DISCONTINUED | OUTPATIENT
Start: 2023-08-03 | End: 2023-08-05 | Stop reason: HOSPADM

## 2023-08-03 RX ORDER — SODIUM CHLORIDE 9 MG/ML
INJECTION, SOLUTION INTRAVENOUS PRN
Status: DISCONTINUED | OUTPATIENT
Start: 2023-08-03 | End: 2023-08-05 | Stop reason: HOSPADM

## 2023-08-03 RX ORDER — SODIUM CHLORIDE 0.9 % (FLUSH) 0.9 %
5-40 SYRINGE (ML) INJECTION EVERY 12 HOURS SCHEDULED
Status: DISCONTINUED | OUTPATIENT
Start: 2023-08-03 | End: 2023-08-05 | Stop reason: HOSPADM

## 2023-08-03 RX ORDER — SODIUM CHLORIDE 0.9 % (FLUSH) 0.9 %
5-40 SYRINGE (ML) INJECTION PRN
Status: DISCONTINUED | OUTPATIENT
Start: 2023-08-03 | End: 2023-08-05 | Stop reason: HOSPADM

## 2023-08-03 RX ORDER — ACETAMINOPHEN 325 MG/1
650 TABLET ORAL EVERY 6 HOURS PRN
Status: DISCONTINUED | OUTPATIENT
Start: 2023-08-03 | End: 2023-08-05 | Stop reason: HOSPADM

## 2023-08-03 RX ORDER — HYDROCODONE BITARTRATE AND ACETAMINOPHEN 5; 325 MG/1; MG/1
1 TABLET ORAL ONCE
Status: COMPLETED | OUTPATIENT
Start: 2023-08-03 | End: 2023-08-03

## 2023-08-03 RX ORDER — QUETIAPINE FUMARATE 400 MG/1
200 TABLET, FILM COATED ORAL NIGHTLY
COMMUNITY
Start: 2023-06-27

## 2023-08-03 RX ORDER — ACETAMINOPHEN 650 MG/1
650 SUPPOSITORY RECTAL EVERY 6 HOURS PRN
Status: DISCONTINUED | OUTPATIENT
Start: 2023-08-03 | End: 2023-08-05 | Stop reason: HOSPADM

## 2023-08-03 RX ORDER — ONDANSETRON 2 MG/ML
4 INJECTION INTRAMUSCULAR; INTRAVENOUS EVERY 30 MIN PRN
Status: DISCONTINUED | OUTPATIENT
Start: 2023-08-03 | End: 2023-08-05 | Stop reason: HOSPADM

## 2023-08-03 RX ORDER — PHENAZOPYRIDINE HYDROCHLORIDE 100 MG/1
200 TABLET, FILM COATED ORAL ONCE
Status: DISCONTINUED | OUTPATIENT
Start: 2023-08-03 | End: 2023-08-03

## 2023-08-03 RX ORDER — NITROFURANTOIN MACROCRYSTALS 100 MG/1
100 CAPSULE ORAL 2 TIMES DAILY
COMMUNITY
Start: 2023-07-28

## 2023-08-03 RX ORDER — POLYETHYLENE GLYCOL 3350 17 G/17G
17 POWDER, FOR SOLUTION ORAL DAILY PRN
Status: DISCONTINUED | OUTPATIENT
Start: 2023-08-03 | End: 2023-08-05 | Stop reason: HOSPADM

## 2023-08-03 RX ORDER — KETOROLAC TROMETHAMINE 15 MG/ML
30 INJECTION, SOLUTION INTRAMUSCULAR; INTRAVENOUS ONCE
Status: COMPLETED | OUTPATIENT
Start: 2023-08-03 | End: 2023-08-03

## 2023-08-03 RX ORDER — ONDANSETRON 2 MG/ML
4 INJECTION INTRAMUSCULAR; INTRAVENOUS EVERY 6 HOURS PRN
Status: DISCONTINUED | OUTPATIENT
Start: 2023-08-03 | End: 2023-08-05 | Stop reason: HOSPADM

## 2023-08-03 RX ORDER — ONDANSETRON 4 MG/1
4 TABLET, ORALLY DISINTEGRATING ORAL EVERY 8 HOURS PRN
Status: DISCONTINUED | OUTPATIENT
Start: 2023-08-03 | End: 2023-08-05 | Stop reason: HOSPADM

## 2023-08-03 RX ORDER — KETOROLAC TROMETHAMINE 15 MG/ML
15 INJECTION, SOLUTION INTRAMUSCULAR; INTRAVENOUS EVERY 6 HOURS PRN
Status: DISCONTINUED | OUTPATIENT
Start: 2023-08-03 | End: 2023-08-05 | Stop reason: HOSPADM

## 2023-08-03 RX ORDER — PHENAZOPYRIDINE HYDROCHLORIDE 100 MG/1
100 TABLET, FILM COATED ORAL 3 TIMES DAILY PRN
COMMUNITY
Start: 2021-07-22

## 2023-08-03 RX ORDER — 0.9 % SODIUM CHLORIDE 0.9 %
1000 INTRAVENOUS SOLUTION INTRAVENOUS ONCE
Status: COMPLETED | OUTPATIENT
Start: 2023-08-03 | End: 2023-08-03

## 2023-08-03 RX ORDER — DEXTROAMPHETAMINE SACCHARATE, AMPHETAMINE ASPARTATE MONOHYDRATE, DEXTROAMPHETAMINE SULFATE AND AMPHETAMINE SULFATE 7.5; 7.5; 7.5; 7.5 MG/1; MG/1; MG/1; MG/1
30 CAPSULE, EXTENDED RELEASE ORAL DAILY
COMMUNITY
Start: 2023-07-06

## 2023-08-03 RX ADMIN — ENOXAPARIN SODIUM 40 MG: 100 INJECTION SUBCUTANEOUS at 18:12

## 2023-08-03 RX ADMIN — SODIUM CHLORIDE 1000 ML: 9 INJECTION, SOLUTION INTRAVENOUS at 10:46

## 2023-08-03 RX ADMIN — TAMSULOSIN HYDROCHLORIDE 0.4 MG: 0.4 CAPSULE ORAL at 18:12

## 2023-08-03 RX ADMIN — IOPAMIDOL 80 ML: 755 INJECTION, SOLUTION INTRAVENOUS at 12:08

## 2023-08-03 RX ADMIN — KETOROLAC TROMETHAMINE 30 MG: 15 INJECTION, SOLUTION INTRAMUSCULAR; INTRAVENOUS at 10:47

## 2023-08-03 RX ADMIN — FENTANYL CITRATE 25 MCG: 50 INJECTION, SOLUTION INTRAMUSCULAR; INTRAVENOUS at 13:36

## 2023-08-03 RX ADMIN — SODIUM CHLORIDE, PRESERVATIVE FREE 10 ML: 5 INJECTION INTRAVENOUS at 20:48

## 2023-08-03 RX ADMIN — ONDANSETRON 4 MG: 2 INJECTION INTRAMUSCULAR; INTRAVENOUS at 13:35

## 2023-08-03 RX ADMIN — KETOROLAC TROMETHAMINE 15 MG: 15 INJECTION, SOLUTION INTRAMUSCULAR; INTRAVENOUS at 18:46

## 2023-08-03 RX ADMIN — CEFTRIAXONE SODIUM 1000 MG: 1 INJECTION, POWDER, FOR SOLUTION INTRAMUSCULAR; INTRAVENOUS at 13:38

## 2023-08-03 RX ADMIN — HYDROCODONE BITARTRATE AND ACETAMINOPHEN 1 TABLET: 5; 325 TABLET ORAL at 10:47

## 2023-08-03 ASSESSMENT — PAIN DESCRIPTION - ORIENTATION
ORIENTATION: RIGHT
ORIENTATION: RIGHT

## 2023-08-03 ASSESSMENT — PAIN DESCRIPTION - DESCRIPTORS
DESCRIPTORS: SHARP
DESCRIPTORS: ACHING

## 2023-08-03 ASSESSMENT — PAIN SCALES - GENERAL
PAINLEVEL_OUTOF10: 5
PAINLEVEL_OUTOF10: 6
PAINLEVEL_OUTOF10: 4

## 2023-08-03 ASSESSMENT — PAIN DESCRIPTION - LOCATION
LOCATION: FLANK
LOCATION: FLANK

## 2023-08-03 NOTE — CARE COORDINATION
CM - Room # 26 -MCG criteria for Abdominal pain , flank Pain , Urolithiasis  reviewed at this time, criteria supports the INPATIENT admission

## 2023-08-03 NOTE — ANESTHESIA PRE PROCEDURE
Department of Anesthesiology  Preprocedure Note       Name:  Sary Manzanares   Age:  20 y.o.  :  2000                                          MRN:  [id number]         Date:  8/3/2023      Surgeon: John Valdivia):  Shaggy Carey MD    Procedure: Procedure(s):  CYSTOSCOPY RETROGRADE PYLEOGRAMS STONE MANIPULATION POSS. STENT    Medications prior to admission:   Prior to Admission medications    Medication Sig Start Date End Date Taking? Authorizing Provider   phenazopyridine (PYRIDIUM) 100 MG tablet Take 1 tablet by mouth Three times daily as needed 21  Yes Historical Provider, MD   amphetamine-dextroamphetamine (ADDERALL XR) 30 MG extended release capsule Take 1 capsule by mouth daily. 23 prescription is noted for BID patient only takes once daily 23   Historical Provider, MD   nitrofurantoin (MACRODANTIN) 100 MG capsule Take 1 capsule by mouth in the morning and 1 capsule in the evening. 23   Historical Provider, MD   QUEtiapine (SEROQUEL) 400 MG tablet Take 0.5 tablets by mouth nightly 23 Prescription is for 400 mg patient splits in half for 200 mg dosing. 23   Historical Provider, MD   ondansetron (ZOFRAN-ODT) 4 MG disintegrating tablet Take 1 tablet by mouth 3 times daily as needed for Nausea or Vomiting 22   Joon Metz DO   levothyroxine (SYNTHROID) 100 MCG tablet Take 1 tablet by mouth Daily    Historical Provider, MD   buPROPion (WELLBUTRIN SR) 200 MG extended release tablet Take 1 tablet by mouth in the morning and 1 tablet in the evening. Historical Provider, MD   busPIRone (BUSPAR) 10 MG tablet Take 1 tablet by mouth in the morning and 1 tablet in the evening. 23 prescription is for 2- tablets TID patient reports only taking 10 mg BID.     Historical Provider, MD   ibuprofen (IBU) 400 MG tablet Take 1 tablet by mouth every 6 hours as needed for Pain 20   Filiberto Rueda MD       Current medications:    Current Facility-Administered Medications

## 2023-08-03 NOTE — ED PROVIDER NOTES
EMERGENCY DEPARTMENT ENCOUNTER        Pt Name: Deana Rodriguez  MRN: [id number]  [address] 2000  Date of evaluation: 8/3/2023  Provider: HAWA Blair  PCP: CHRISTOPHER Baumann - CNP    LISBETH. I have evaluated this patient. Triage CHIEF COMPLAINT       Chief Complaint   Patient presents with    Dysuria     HISTORY OF PRESENT ILLNESS      Chief Complaint: Right-sided flank pain, dysuria, frequency    Deana Rodriguez is a 20 y.o.  female who presents to the emergency department today via private vehicle with continued right-sided flank pain, dysuria. States been ongoing for several weeks, states that she has been on a course of Bactrim that which she had leftover, seen her OB/GYN, underwent a pelvic examination, placed on Keflex and then Macrobid, states urinary culture which is demonstrating Citrobacter bacterium in her urine. She is present today with continued right-sided flank pain, frequency, dysuria. She locates her pain discomfort through her right side, right lower back, flank, right side of her abdomen. She denies any active fevers chills, nausea vomiting. Denies change in bowel habits. Denies pregnancy, states that she is currently on her period, denies vaginal discharge, no history of significant OB/GYN issues, no concern for/transmitted infection or discharge. Nursing Notes were all reviewed and agreed with or any disagreements were addressed in the HPI. REVIEW OF SYSTEMS     At least 10 systems reviewed and otherwise acutely negative except as in the [address] [facility] [address] PAST MEDICAL HISTORY     Past Medical History:   Diagnosis Date    Headache(784.0)     Hypertension        SURGICAL HISTORY   History reviewed. No pertinent surgical history. CURRENTMEDICATIONS       Previous Medications    AMPHETAMINE-DEXTROAMPHETAMINE (ADDERALL XR) 30 MG EXTENDED RELEASE CAPSULE    Take 1 capsule by mouth daily.  08/03/23 prescription is noted for BID patient only takes once daily infusion (has no administration in time range)   enoxaparin (LOVENOX) injection 40 mg (has no administration in time range)   ondansetron (ZOFRAN-ODT) disintegrating tablet 4 mg (has no administration in time range)     Or   ondansetron (ZOFRAN) injection 4 mg (has no administration in time range)   polyethylene glycol (GLYCOLAX) packet 17 g (has no administration in time range)   acetaminophen (TYLENOL) tablet 650 mg (has no administration in time range)     Or   acetaminophen (TYLENOL) suppository 650 mg (has no administration in time range)   tamsulosin (FLOMAX) capsule 0.4 mg (has no administration in time range)   sodium chloride 0.9 % bolus 1,000 mL (0 mLs IntraVENous Stopped 8/3/23 1146)   ketorolac (TORADOL) injection 30 mg (30 mg IntraVENous Given 8/3/23 1047)   HYDROcodone-acetaminophen (NORCO) 5-325 MG per tablet 1 tablet (1 tablet Oral Given 8/3/23 1047)   iopamidol (ISOVUE-370) 76 % injection 80 mL (80 mLs IntraVENous Given 8/3/23 1208)   cefTRIAXone (ROCEPHIN) 1,000 mg in sodium chloride 0.9 % 50 mL IVPB (mini-bag) (0 mg IntraVENous Stopped 8/3/23 1408)   fentaNYL (SUBLIMAZE) injection 25 mcg (25 mcg IntraVENous Given 8/3/23 1336)       Independent Imaging Interpretation by me: CT scan of the abdomen pelvis interpreted by myself demonstrating the stone at the UVJ with signs of perinephric stranding and moderate hydroureteronephrosis    Discussion with Other Profesionals : None    Social Determinants : None    Records Reviewed : None    Admission    I am the Primary Clinician of Record. CLINICAL IMPRESSION      1. Ureterolithiasis    2. Urinary tract infection without hematuria, site unspecified          DISPOSITION/PLAN   DISPOSITION Admitted 08/03/2023 02:00:26 PM      PATIENT REFERREDTO:  No follow-up provider specified.     DISCHARGE MEDICATIONS:  New Prescriptions    No medications on file       DISCONTINUED MEDICATIONS:  Discontinued Medications    HYDROXYZINE HCL PO    Take by mouth 3 times

## 2023-08-03 NOTE — ED NOTES
ED TO INPATIENT SBAR HANDOFF    Patient Name: Fabrizio Pitts   :  2000  20 y.o. Preferred Name  Billie  Family/Caregiver Present no   Restraints no   C-SSRS: Risk of Suicide: No Risk  Sitter no   Sepsis Risk Score Sepsis Risk Score: 0.46      Situation  Chief Complaint   Patient presents with    Dysuria     Brief Description of Patient's Condition: Pt has been being treated for a UTI. Pt presented with right sided flank pain. Pt has kidney stone. Urology was consulted. Mental Status: oriented and alert  Arrived from: home    Imaging:   CT ABDOMEN PELVIS W IV CONTRAST Additional Contrast? None   Final Result   Distal right/ureterovesical junction stone with mild right hydronephrosis.            Abnormal labs:   Abnormal Labs Reviewed   URINALYSIS - Abnormal; Notable for the following components:       Result Value    Color, UA ORANGE (*)     Glucose, Urine 250 (*)     Ketones, Urine TRACE (*)     Protein, UA >300 (*)     Urobilinogen, Urine >8.0 (*)     Nitrite Urine, Quantitative POSITIVE (*)     All other components within normal limits   CBC WITH AUTO DIFFERENTIAL - Abnormal; Notable for the following components:    Segs Relative 73.0 (*)     Lymphocytes % 15.4 (*)     Monocytes % 10.2 (*)     All other components within normal limits   MICROSCOPIC URINALYSIS - Abnormal; Notable for the following components:    Mucus, UA RARE (*)     All other components within normal limits       Background  History:   Past Medical History:   Diagnosis Date    Headache(784.0)     Hypertension        Assessment    Vitals/MEWS:        Vitals:    23 1214 23 1339 23 1401 23 1431   BP:  (!) 142/96 129/87 132/87   Pulse: 78 80 77 80   Resp: 14 14 17 15   Temp:       TempSrc:       SpO2: 100%        PO Status: NPO  O2 Flow Rate:      Cardiac Rhythm: Normal sinus  Last documented pain medication administered: 1336  NIH Score: NIH     Active LDA's:   Peripheral IV 23 Right Antecubital (Active)

## 2023-08-03 NOTE — H&P
V2.0  History and Physical      Name:  Lizbet Medrano /Age/Sex: 2000  (20 y.o. female)   MRN & CSN:  [id number] & [id number] Encounter Date/Time: 8/3/2023 2:01 PM EDT   Location:  ED26/ED-26 PCP: Bruce Abdul, [address] [place] [address] Dr Day: 1    Assessment and Plan:   Lizbet Medrano is a 20 y.o. female  who presents with Urolithiasis    1. Right ureterolithiasis  -Presenting with right flank pain with associated urgency and frequency  -Urinalysis a couple of days ago suggestive for UTI  -Urology consulted  -Recommended n.p.o. for possible intervention today  -Continue supportive care with antiemetics/analgesics/adequate hydration/antibiotics    Hospital Problems             Last Modified POA    * (Principal) Urolithiasis 8/3/2023 Yes           Disposition:   Current Living situation: Home  Expected Disposition: Home  Estimated D/C: TBD    Diet Diet NPO   DVT Prophylaxis [] Lovenox, []  Heparin, [] SCDs, [] Ambulation,  [] Eliquis, [] Xarelto, [] Coumadin   Code Status Full Code   Surrogate Decision Maker/ POA      Personally reviewed Lab Studies and Imaging             History from:     patient    History of Present Illness:     Chief Complaint: Right side pain  Lizbet Medrano is a 20 y.o. female with pmh of hypertension who presents with right flank pain. Patient states that her pain has been going on for the past couple of weeks which she thought was initially a UTI and she subsequently went to see OB/GYN who did a urinalysis that was positive and was started on antibiotics however she had to go back due to resistance after which antibiotics were changed however the pain persisted. Reports right flank pain and also lower back pain with some intermittent radiation to the right groin. Reports an episode of nausea today but denies any vomiting. Reports associated urgency and dysuria. Denies associated fever or chills. Denies chest pain, shortness of breath, headaches or cough.   On recent uti, continued symptoms despite abx Additional Contrast?->None Decision Support Exception - unselect if not a suspected or confirmed emergency medical condition->Emergency Medical Condition (MA) Reason for Exam: right flank pain, recent uti, continued symptoms despite abx Relevant Medical/Surgical History: 80 ML ISOVUE 370 FINDINGS: Lower chest: Heart size is normal. Lungs are clear Liver: Liver is normal density. No enhancing masses. Normal enhancement of the intrahepatic vasculature. Liver is elongated at 18 cm. Spleen:  Normal size. No enhancing masses Pancreas: No enhancing masses. No ductal dilation. No adjacent fatty stranding. Gallbladder no calcified stones or sludge. No pericholecystic fluid. No wall thickening. Bile ducts: No biliary ductal dilation Adrenals: The adrenal glands are unremarkable Kidneys: Diffuse decreased enhancement of the right kidney when compared to the left. Minimal perinephric stranding. Negative for renal abscess. Mild right hydronephrosis. Stone in the distal right ureter or the uterus cul junction. It measures approximately 3 mm x 5 mm. Norenal stones. Kidney is unremarkable. GI: No small bowel dilation. No colonic wall thickening. No large mass. The stomach is unremarkable in appearance. Mesentery: No enlarged lymphadenopathy. No free fluid. No free gas. Aorta: Aorta is of normal size. Negative for dissection. IVC is unremarkable. Celiac axis and SMA are patent. Portal vein is patent. PELVIS GI: No small bowel dilation. No colonic wall thickening. No enlarged lymphadenopathy. no free fluid in the pelvis. : The bladder is unremarkable in appearance. No large mass. Right ovarian cyst.  This has a Hounsfield units of 17. It measures 3.8 cm by 4.8 cm. This does not need to be followed. Multiple small follicles in left ovary. Uterus is unremarkable. Phleboliths in the pelvis Osseous: Unremarkable.   S1 appears to be transitional.     Distal

## 2023-08-03 NOTE — ED NOTES
Medication History  [facility]    Patient Name: Abdelrahman Salas 2000     Medication history has been completed by: Fredi Schofield CPhT    Source(s) of information: patient and insurance claims     Primary Care Physician: CHRISTOPHER Busch - CNP     Pharmacy: [facility]    Allergies as of 08/03/2023 - Fully Reviewed 11/27/2022   Allergen Reaction Noted    Jerral Cramp [loracarbef]  02/03/2017        Prior to Admission medications    Medication Sig Start Date End Date Taking? Authorizing Provider   phenazopyridine (PYRIDIUM) 100 MG tablet Take 1 tablet by mouth Three times daily as needed 7/22/21  Yes Historical Provider, MD   amphetamine-dextroamphetamine (ADDERALL XR) 30 MG extended release capsule Take 1 capsule by mouth daily. 08/03/23 prescription is noted for BID patient only takes once daily 7/6/23   Historical Provider, MD   nitrofurantoin (MACRODANTIN) 100 MG capsule Take 1 capsule by mouth in the morning and 1 capsule in the evening. 7/28/23   Historical Provider, MD   QUEtiapine (SEROQUEL) 400 MG tablet Take 0.5 tablets by mouth nightly   08/03/23 Prescription is for 400 mg patient splits in half for 200 mg dosing. 6/27/23   Historical Provider, MD   ondansetron (ZOFRAN-ODT) 4 MG disintegrating tablet Take 1 tablet by mouth 3 times daily as needed for Nausea or Vomiting 11/27/22   Joon Butler DO   levothyroxine (SYNTHROID) 100 MCG tablet Take 1 tablet by mouth Daily    Historical Provider, MD   buPROPion (WELLBUTRIN SR) 200 MG extended release tablet Take 1 tablet by mouth in the morning and 1 tablet in the evening. Historical Provider, MD   busPIRone (BUSPAR) 10 MG tablet Take 1 tablet by mouth in the morning and 1 tablet in the evening. 08/03/23 prescription is for 2- tablets TID patient reports only taking 10 mg BID.     Historical Provider, MD   ibuprofen (IBU) 400 MG tablet Take 1 tablet by mouth every 6 hours as needed for Pain 11/19/20   Destiney Lang MD Medications added or changed (ex. new medication, dosage change, interval change, formulation change): Macrobid (added)  Pyridium (added)  Adderall (added)  Quetiapine (added)    Medications removed from list (include reason, ex. noncompliance, medication cost, therapy complete etc.):   Ondansetron duplicate  Hydroxyzine not taking    Comments:  Medication list reviewed with patient and insurance claims verified. Patient reports she only took her antibiotic and pyridium this morning.   Claims for propranolol not taking    To my knowledge the above medication history is accurate as of 8/3/2023 1:34 PM.   Elaine Payton CPhT   8/3/2023 1:34 PM

## 2023-08-04 ENCOUNTER — APPOINTMENT (OUTPATIENT)
Dept: GENERAL RADIOLOGY | Age: 23
DRG: 446 | End: 2023-08-04
Payer: COMMERCIAL

## 2023-08-04 LAB
ANION GAP SERPL CALCULATED.3IONS-SCNC: 10 MMOL/L (ref 4–16)
BASOPHILS ABSOLUTE: 0 K/CU MM
BASOPHILS RELATIVE PERCENT: 0.4 % (ref 0–1)
BUN SERPL-MCNC: 15 MG/DL (ref 6–23)
CALCIUM SERPL-MCNC: 9.1 MG/DL (ref 8.3–10.6)
CHLORIDE BLD-SCNC: 105 MMOL/L (ref 99–110)
CO2: 23 MMOL/L (ref 21–32)
CREAT SERPL-MCNC: 0.7 MG/DL (ref 0.6–1.1)
CULTURE: NORMAL
CULTURE: NORMAL
DIFFERENTIAL TYPE: ABNORMAL
EOSINOPHILS ABSOLUTE: 0 K/CU MM
EOSINOPHILS RELATIVE PERCENT: 0.1 % (ref 0–3)
GFR SERPL CREATININE-BSD FRML MDRD: >60 ML/MIN/1.73M2
GLUCOSE SERPL-MCNC: 130 MG/DL (ref 70–99)
HCT VFR BLD CALC: 38 % (ref 37–47)
HEMOGLOBIN: 12.5 GM/DL (ref 12.5–16)
IMMATURE NEUTROPHIL %: 0.1 % (ref 0–0.43)
LYMPHOCYTES ABSOLUTE: 0.7 K/CU MM
LYMPHOCYTES RELATIVE PERCENT: 10.7 % (ref 24–44)
Lab: NORMAL
MCH RBC QN AUTO: 28.6 PG (ref 27–31)
MCHC RBC AUTO-ENTMCNC: 32.9 % (ref 32–36)
MCV RBC AUTO: 87 FL (ref 78–100)
MONOCYTES ABSOLUTE: 0.1 K/CU MM
MONOCYTES RELATIVE PERCENT: 1.3 % (ref 0–4)
NUCLEATED RBC %: 0 %
PDW BLD-RTO: 12.5 % (ref 11.7–14.9)
PLATELET # BLD: 334 K/CU MM (ref 140–440)
PMV BLD AUTO: 8 FL (ref 7.5–11.1)
POTASSIUM SERPL-SCNC: 4.4 MMOL/L (ref 3.5–5.1)
RBC # BLD: 4.37 M/CU MM (ref 4.2–5.4)
SEGMENTED NEUTROPHILS ABSOLUTE COUNT: 5.9 K/CU MM
SEGMENTED NEUTROPHILS RELATIVE PERCENT: 87.4 % (ref 36–66)
SODIUM BLD-SCNC: 138 MMOL/L (ref 135–145)
SPECIMEN: NORMAL
TOTAL IMMATURE NEUTOROPHIL: 0.01 K/CU MM
TOTAL NUCLEATED RBC: 0 K/CU MM
WBC # BLD: 6.8 K/CU MM (ref 4–10.5)

## 2023-08-04 PROCEDURE — 6370000000 HC RX 637 (ALT 250 FOR IP): Performed by: PHYSICIAN ASSISTANT

## 2023-08-04 PROCEDURE — 2720000010 HC SURG SUPPLY STERILE: Performed by: SPECIALIST

## 2023-08-04 PROCEDURE — 7100000001 HC PACU RECOVERY - ADDTL 15 MIN: Performed by: SPECIALIST

## 2023-08-04 PROCEDURE — 36415 COLL VENOUS BLD VENIPUNCTURE: CPT

## 2023-08-04 PROCEDURE — C1758 CATHETER, URETERAL: HCPCS | Performed by: SPECIALIST

## 2023-08-04 PROCEDURE — 85025 COMPLETE CBC W/AUTO DIFF WBC: CPT

## 2023-08-04 PROCEDURE — 3700000001 HC ADD 15 MINUTES (ANESTHESIA): Performed by: SPECIALIST

## 2023-08-04 PROCEDURE — 94761 N-INVAS EAR/PLS OXIMETRY MLT: CPT

## 2023-08-04 PROCEDURE — 2709999900 HC NON-CHARGEABLE SUPPLY: Performed by: SPECIALIST

## 2023-08-04 PROCEDURE — 3600000013 HC SURGERY LEVEL 3 ADDTL 15MIN: Performed by: SPECIALIST

## 2023-08-04 PROCEDURE — 0TC68ZZ EXTIRPATION OF MATTER FROM RIGHT URETER, VIA NATURAL OR ARTIFICIAL OPENING ENDOSCOPIC: ICD-10-PCS | Performed by: SPECIALIST

## 2023-08-04 PROCEDURE — 6360000002 HC RX W HCPCS

## 2023-08-04 PROCEDURE — 1200000000 HC SEMI PRIVATE

## 2023-08-04 PROCEDURE — C2617 STENT, NON-COR, TEM W/O DEL: HCPCS | Performed by: SPECIALIST

## 2023-08-04 PROCEDURE — 2580000003 HC RX 258: Performed by: STUDENT IN AN ORGANIZED HEALTH CARE EDUCATION/TRAINING PROGRAM

## 2023-08-04 PROCEDURE — 6360000002 HC RX W HCPCS: Performed by: NURSE ANESTHETIST, CERTIFIED REGISTERED

## 2023-08-04 PROCEDURE — 6360000002 HC RX W HCPCS: Performed by: STUDENT IN AN ORGANIZED HEALTH CARE EDUCATION/TRAINING PROGRAM

## 2023-08-04 PROCEDURE — 7100000000 HC PACU RECOVERY - FIRST 15 MIN: Performed by: SPECIALIST

## 2023-08-04 PROCEDURE — 6370000000 HC RX 637 (ALT 250 FOR IP): Performed by: SPECIALIST

## 2023-08-04 PROCEDURE — 88300 SURGICAL PATH GROSS: CPT | Performed by: PATHOLOGY

## 2023-08-04 PROCEDURE — BT1D1ZZ FLUOROSCOPY OF RIGHT KIDNEY, URETER AND BLADDER USING LOW OSMOLAR CONTRAST: ICD-10-PCS | Performed by: SPECIALIST

## 2023-08-04 PROCEDURE — 82360 CALCULUS ASSAY QUANT: CPT

## 2023-08-04 PROCEDURE — 3700000000 HC ANESTHESIA ATTENDED CARE: Performed by: SPECIALIST

## 2023-08-04 PROCEDURE — 80048 BASIC METABOLIC PNL TOTAL CA: CPT

## 2023-08-04 PROCEDURE — 0T768DZ DILATION OF RIGHT URETER WITH INTRALUMINAL DEVICE, VIA NATURAL OR ARTIFICIAL OPENING ENDOSCOPIC: ICD-10-PCS | Performed by: SPECIALIST

## 2023-08-04 PROCEDURE — 3600000003 HC SURGERY LEVEL 3 BASE: Performed by: SPECIALIST

## 2023-08-04 PROCEDURE — 6360000002 HC RX W HCPCS: Performed by: SPECIALIST

## 2023-08-04 PROCEDURE — 6360000004 HC RX CONTRAST MEDICATION: Performed by: UROLOGY

## 2023-08-04 PROCEDURE — C1769 GUIDE WIRE: HCPCS | Performed by: SPECIALIST

## 2023-08-04 PROCEDURE — 76000 FLUOROSCOPY <1 HR PHYS/QHP: CPT

## 2023-08-04 DEVICE — VARIABLE LENGTH URETERAL STENT
Type: IMPLANTABLE DEVICE | Status: FUNCTIONAL
Brand: CONTOUR VL™

## 2023-08-04 RX ORDER — SODIUM CHLORIDE, SODIUM LACTATE, POTASSIUM CHLORIDE, CALCIUM CHLORIDE 600; 310; 30; 20 MG/100ML; MG/100ML; MG/100ML; MG/100ML
INJECTION, SOLUTION INTRAVENOUS CONTINUOUS PRN
Status: DISCONTINUED | OUTPATIENT
Start: 2023-08-04 | End: 2023-08-04 | Stop reason: SDUPTHER

## 2023-08-04 RX ORDER — DROPERIDOL 2.5 MG/ML
0.62 INJECTION, SOLUTION INTRAMUSCULAR; INTRAVENOUS
Status: DISCONTINUED | OUTPATIENT
Start: 2023-08-04 | End: 2023-08-04 | Stop reason: HOSPADM

## 2023-08-04 RX ORDER — SODIUM CHLORIDE 0.9 % (FLUSH) 0.9 %
5-40 SYRINGE (ML) INJECTION PRN
Status: DISCONTINUED | OUTPATIENT
Start: 2023-08-04 | End: 2023-08-04 | Stop reason: HOSPADM

## 2023-08-04 RX ORDER — CIPROFLOXACIN 2 MG/ML
400 INJECTION, SOLUTION INTRAVENOUS ONCE
Status: COMPLETED | OUTPATIENT
Start: 2023-08-04 | End: 2023-08-04

## 2023-08-04 RX ORDER — DEXAMETHASONE SODIUM PHOSPHATE 4 MG/ML
INJECTION, SOLUTION INTRA-ARTICULAR; INTRALESIONAL; INTRAMUSCULAR; INTRAVENOUS; SOFT TISSUE PRN
Status: DISCONTINUED | OUTPATIENT
Start: 2023-08-04 | End: 2023-08-04

## 2023-08-04 RX ORDER — FENTANYL CITRATE 50 UG/ML
50 INJECTION, SOLUTION INTRAMUSCULAR; INTRAVENOUS EVERY 5 MIN PRN
Status: DISCONTINUED | OUTPATIENT
Start: 2023-08-04 | End: 2023-08-04 | Stop reason: HOSPADM

## 2023-08-04 RX ORDER — LABETALOL HYDROCHLORIDE 5 MG/ML
10 INJECTION, SOLUTION INTRAVENOUS
Status: DISCONTINUED | OUTPATIENT
Start: 2023-08-04 | End: 2023-08-04 | Stop reason: HOSPADM

## 2023-08-04 RX ORDER — KETOROLAC TROMETHAMINE 30 MG/ML
INJECTION, SOLUTION INTRAMUSCULAR; INTRAVENOUS PRN
Status: DISCONTINUED | OUTPATIENT
Start: 2023-08-04 | End: 2023-08-04 | Stop reason: SDUPTHER

## 2023-08-04 RX ORDER — ONDANSETRON 2 MG/ML
4 INJECTION INTRAMUSCULAR; INTRAVENOUS
Status: DISCONTINUED | OUTPATIENT
Start: 2023-08-04 | End: 2023-08-04 | Stop reason: HOSPADM

## 2023-08-04 RX ORDER — ONDANSETRON 2 MG/ML
INJECTION INTRAMUSCULAR; INTRAVENOUS PRN
Status: DISCONTINUED | OUTPATIENT
Start: 2023-08-04 | End: 2023-08-04 | Stop reason: SDUPTHER

## 2023-08-04 RX ORDER — SODIUM CHLORIDE 9 MG/ML
INJECTION, SOLUTION INTRAVENOUS PRN
Status: DISCONTINUED | OUTPATIENT
Start: 2023-08-04 | End: 2023-08-04 | Stop reason: HOSPADM

## 2023-08-04 RX ORDER — LIDOCAINE HYDROCHLORIDE 20 MG/ML
INJECTION, SOLUTION INTRAVENOUS PRN
Status: DISCONTINUED | OUTPATIENT
Start: 2023-08-04 | End: 2023-08-04 | Stop reason: SDUPTHER

## 2023-08-04 RX ORDER — FENTANYL CITRATE 50 UG/ML
INJECTION, SOLUTION INTRAMUSCULAR; INTRAVENOUS PRN
Status: DISCONTINUED | OUTPATIENT
Start: 2023-08-04 | End: 2023-08-04 | Stop reason: SDUPTHER

## 2023-08-04 RX ORDER — CIPROFLOXACIN 2 MG/ML
200 INJECTION, SOLUTION INTRAVENOUS ONCE
Status: CANCELLED | OUTPATIENT
Start: 2023-08-04

## 2023-08-04 RX ORDER — HYDRALAZINE HYDROCHLORIDE 20 MG/ML
10 INJECTION INTRAMUSCULAR; INTRAVENOUS
Status: DISCONTINUED | OUTPATIENT
Start: 2023-08-04 | End: 2023-08-04 | Stop reason: HOSPADM

## 2023-08-04 RX ORDER — MEPERIDINE HYDROCHLORIDE 25 MG/ML
12.5 INJECTION INTRAMUSCULAR; INTRAVENOUS; SUBCUTANEOUS EVERY 5 MIN PRN
Status: DISCONTINUED | OUTPATIENT
Start: 2023-08-04 | End: 2023-08-04 | Stop reason: HOSPADM

## 2023-08-04 RX ORDER — PROPOFOL 10 MG/ML
INJECTION, EMULSION INTRAVENOUS PRN
Status: DISCONTINUED | OUTPATIENT
Start: 2023-08-04 | End: 2023-08-04 | Stop reason: SDUPTHER

## 2023-08-04 RX ORDER — DEXAMETHASONE SODIUM PHOSPHATE 4 MG/ML
INJECTION, SOLUTION INTRA-ARTICULAR; INTRALESIONAL; INTRAMUSCULAR; INTRAVENOUS; SOFT TISSUE PRN
Status: DISCONTINUED | OUTPATIENT
Start: 2023-08-04 | End: 2023-08-04 | Stop reason: SDUPTHER

## 2023-08-04 RX ORDER — OXYCODONE HYDROCHLORIDE AND ACETAMINOPHEN 5; 325 MG/1; MG/1
1 TABLET ORAL EVERY 6 HOURS PRN
Status: DISCONTINUED | OUTPATIENT
Start: 2023-08-04 | End: 2023-08-05 | Stop reason: HOSPADM

## 2023-08-04 RX ORDER — SODIUM CHLORIDE 0.9 % (FLUSH) 0.9 %
5-40 SYRINGE (ML) INJECTION EVERY 12 HOURS SCHEDULED
Status: DISCONTINUED | OUTPATIENT
Start: 2023-08-04 | End: 2023-08-04 | Stop reason: HOSPADM

## 2023-08-04 RX ORDER — OXYCODONE HYDROCHLORIDE 5 MG/1
5 TABLET ORAL
Status: DISCONTINUED | OUTPATIENT
Start: 2023-08-04 | End: 2023-08-04 | Stop reason: HOSPADM

## 2023-08-04 RX ORDER — MORPHINE SULFATE 2 MG/ML
0.5 INJECTION, SOLUTION INTRAMUSCULAR; INTRAVENOUS ONCE
Status: COMPLETED | OUTPATIENT
Start: 2023-08-04 | End: 2023-08-04

## 2023-08-04 RX ADMIN — LIDOCAINE HYDROCHLORIDE 100 MG: 20 INJECTION, SOLUTION INTRAVENOUS at 13:42

## 2023-08-04 RX ADMIN — ONDANSETRON 4 MG: 2 INJECTION INTRAMUSCULAR; INTRAVENOUS at 17:12

## 2023-08-04 RX ADMIN — SODIUM CHLORIDE, PRESERVATIVE FREE 10 ML: 5 INJECTION INTRAVENOUS at 21:30

## 2023-08-04 RX ADMIN — KETOROLAC TROMETHAMINE 15 MG: 15 INJECTION, SOLUTION INTRAMUSCULAR; INTRAVENOUS at 23:30

## 2023-08-04 RX ADMIN — TAMSULOSIN HYDROCHLORIDE 0.4 MG: 0.4 CAPSULE ORAL at 09:43

## 2023-08-04 RX ADMIN — PROPOFOL 200 MG: 10 INJECTION, EMULSION INTRAVENOUS at 13:42

## 2023-08-04 RX ADMIN — FENTANYL CITRATE 50 MCG: 50 INJECTION, SOLUTION INTRAMUSCULAR; INTRAVENOUS at 13:37

## 2023-08-04 RX ADMIN — ENOXAPARIN SODIUM 40 MG: 100 INJECTION SUBCUTANEOUS at 17:12

## 2023-08-04 RX ADMIN — ONDANSETRON 4 MG: 2 INJECTION INTRAMUSCULAR; INTRAVENOUS at 09:42

## 2023-08-04 RX ADMIN — MORPHINE SULFATE 0.5 MG: 2 INJECTION, SOLUTION INTRAMUSCULAR; INTRAVENOUS at 09:38

## 2023-08-04 RX ADMIN — SODIUM CHLORIDE, PRESERVATIVE FREE 10 ML: 5 INJECTION INTRAVENOUS at 09:43

## 2023-08-04 RX ADMIN — KETOROLAC TROMETHAMINE 15 MG: 30 INJECTION, SOLUTION INTRAMUSCULAR; INTRAVENOUS at 14:17

## 2023-08-04 RX ADMIN — FENTANYL CITRATE 50 MCG: 50 INJECTION, SOLUTION INTRAMUSCULAR; INTRAVENOUS at 13:55

## 2023-08-04 RX ADMIN — ONDANSETRON 4 MG: 2 INJECTION INTRAMUSCULAR; INTRAVENOUS at 13:42

## 2023-08-04 RX ADMIN — SODIUM CHLORIDE, SODIUM LACTATE, POTASSIUM CHLORIDE, CALCIUM CHLORIDE: 600; 310; 30; 20 INJECTION, SOLUTION INTRAVENOUS at 13:53

## 2023-08-04 RX ADMIN — CIPROFLOXACIN 400 MG: 2 INJECTION, SOLUTION INTRAVENOUS at 13:49

## 2023-08-04 RX ADMIN — DEXAMETHASONE SODIUM PHOSPHATE 4 MG: 4 INJECTION, SOLUTION INTRAMUSCULAR; INTRAVENOUS at 13:42

## 2023-08-04 RX ADMIN — OXYCODONE AND ACETAMINOPHEN 1 TABLET: 5; 325 TABLET ORAL at 21:30

## 2023-08-04 RX ADMIN — KETOROLAC TROMETHAMINE 15 MG: 15 INJECTION, SOLUTION INTRAMUSCULAR; INTRAVENOUS at 06:53

## 2023-08-04 RX ADMIN — KETOROLAC TROMETHAMINE 15 MG: 15 INJECTION, SOLUTION INTRAMUSCULAR; INTRAVENOUS at 17:34

## 2023-08-04 ASSESSMENT — PAIN SCALES - GENERAL
PAINLEVEL_OUTOF10: 5
PAINLEVEL_OUTOF10: 6
PAINLEVEL_OUTOF10: 7
PAINLEVEL_OUTOF10: 7
PAINLEVEL_OUTOF10: 5

## 2023-08-04 ASSESSMENT — PAIN DESCRIPTION - LOCATION
LOCATION: PERINEUM
LOCATION: FLANK
LOCATION: PELVIS
LOCATION: PERINEUM
LOCATION: FLANK

## 2023-08-04 ASSESSMENT — PAIN DESCRIPTION - PAIN TYPE: TYPE: ACUTE PAIN

## 2023-08-04 ASSESSMENT — PAIN DESCRIPTION - ONSET: ONSET: ON-GOING

## 2023-08-04 ASSESSMENT — PAIN DESCRIPTION - DESCRIPTORS
DESCRIPTORS: ACHING;DISCOMFORT
DESCRIPTORS: BURNING

## 2023-08-04 ASSESSMENT — PAIN DESCRIPTION - FREQUENCY
FREQUENCY: CONTINUOUS
FREQUENCY: CONTINUOUS

## 2023-08-04 ASSESSMENT — PAIN DESCRIPTION - ORIENTATION
ORIENTATION: RIGHT
ORIENTATION: RIGHT

## 2023-08-04 ASSESSMENT — PAIN - FUNCTIONAL ASSESSMENT
PAIN_FUNCTIONAL_ASSESSMENT: ACTIVITIES ARE NOT PREVENTED
PAIN_FUNCTIONAL_ASSESSMENT: PREVENTS OR INTERFERES SOME ACTIVE ACTIVITIES AND ADLS

## 2023-08-04 NOTE — ANESTHESIA PRE PROCEDURE
Department of Anesthesiology  Preprocedure Note       Name:  Bairon Hayward   Age:  20 y.o.  :  2000                                          MRN:  [id number]         Date:  2023      Surgeon: Thang Shen):  Mynor Villegas MD    Procedure: Procedure(s):  CYSTOSCOPY RETROGRADE PYLEOGRAMS STONE MANIPULATION POSS. STENT    Medications prior to admission:   Prior to Admission medications    Medication Sig Start Date End Date Taking? Authorizing Provider   amphetamine-dextroamphetamine (ADDERALL XR) 30 MG extended release capsule Take 1 capsule by mouth daily. 23 prescription is noted for BID patient only takes once daily 23   Historical Provider, MD   nitrofurantoin (MACRODANTIN) 100 MG capsule Take 1 capsule by mouth in the morning and 1 capsule in the evening. 23   Historical Provider, MD   phenazopyridine (PYRIDIUM) 100 MG tablet Take 1 tablet by mouth Three times daily as needed 21   Historical Provider, MD   QUEtiapine (SEROQUEL) 400 MG tablet Take 0.5 tablets by mouth nightly 23 Prescription is for 400 mg patient splits in half for 200 mg dosing. 23   Historical Provider, MD   ondansetron (ZOFRAN-ODT) 4 MG disintegrating tablet Take 1 tablet by mouth 3 times daily as needed for Nausea or Vomiting 22   Joon Glass, DO   levothyroxine (SYNTHROID) 100 MCG tablet Take 1 tablet by mouth Daily    Historical Provider, MD   buPROPion (WELLBUTRIN SR) 200 MG extended release tablet Take 1 tablet by mouth in the morning and 1 tablet in the evening. Historical Provider, MD   busPIRone (BUSPAR) 10 MG tablet Take 1 tablet by mouth in the morning and 1 tablet in the evening. 23 prescription is for 2- tablets TID patient reports only taking 10 mg BID.     Historical Provider, MD   ibuprofen (IBU) 400 MG tablet Take 1 tablet by mouth every 6 hours as needed for Pain 20   Glendy Ruth MD       Current medications:    No current facility-administered medications for

## 2023-08-04 NOTE — OP NOTE
[address], [address] [place] [address]                                OPERATIVE REPORT    PATIENT NAME: Lizbet Medrano                    :        2000  MED REC NO:   [id number]                          ROOM:       110  ACCOUNT NO:   [de-identified]                           ADMIT DATE: 2023  PROVIDER:     Saqib Gardner MD    DATE OF PROCEDURE:  2023    PREOPERATIVE DIAGNOSIS:  Right ureteral calculus. POSTOPERATIVE DIAGNOSIS:  Right ureteral calculus. PROCEDURE PERFORMED:  Cystoscopy, right retrograde pyelogram, right  ureteroscopy, stone basket extraction, right ureteral stent placement. SURGEON:  Saqib Gardner MD    ANESTHESIA:  General anesthesia. ESTIMATED BLOOD LOSS:  None. COMPLICATIONS:  None. BRIEF HISTORY:  This is a 18-year-old female who was seen in the office  earlier in the week, diagnosed with a 3-mm distal stone. The urine  culture did come back positive. Started on the antibiotic. Admitted  for pain control and the above said procedure. DESCRIPTION OF PROCEDURE:  The patient was identified in the holding  area, taken to the procedure room, placed in the dorsal lithotomy  position. The patient's genital region was prepped and draped in the  usual fashion. A 21-Salvadorean cystoscope sheath was introduced per urethra  under direct visualization. The bladder mucosa was visualized. No  signs of infection. Right ureteral orifice was identified, very  edematous. An 8-Salvadorean cone-tip catheter was inserted into the right ureteral  orifice. Injection of retrograde contrast material demonstrated a small  radiopacity at the UVJ with hydroureter above this. At this point, I did replace the sensor wire via the cystoscope into the  ureter, but the stone was totally obstructing the distal ureter.   At  this point, I used a semi-rigid ureteroscope under direct visualization  to the UO,

## 2023-08-04 NOTE — ANESTHESIA POSTPROCEDURE EVALUATION
Department of Anesthesiology  Postprocedure Note    Patient: Navid Linn  MRN: [id number]  YOB: 2000  Date of evaluation: 8/4/2023      Procedure Summary     Date: 08/04/23 Room / Location: [address] [place] [address]    Anesthesia Start: 7869 Anesthesia Stop: 9572    Procedure: CYSTOSCOPY, URETEROSCOPY, RETROGRADE PYLEOGRAMS, STONE BASKET EXTRACTION WITH STENT INSERTION (Right) Diagnosis:       Kidney stone      (Kidney stone [N20.0])    Surgeons: Mir Fam MD Responsible Provider: Manoj Peña MD    Anesthesia Type: general ASA Status: 2          Anesthesia Type: No value filed.     Moi Phase I: Moi Score: 10    Moi Phase II:        Anesthesia Post Evaluation    Patient location during evaluation: PACU  Patient participation: complete - patient participated  Level of consciousness: awake  Pain score: 1  Airway patency: patent  Nausea & Vomiting: no nausea  Complications: no  Cardiovascular status: hemodynamically stable  Respiratory status: nasal cannula  Hydration status: euvolemic  Multimodal analgesia pain management approach

## 2023-08-04 NOTE — CARE COORDINATION
Chart reviewed. From home, independent. PCP and insurance. No needs identified at this time. CM available should any new needs arise.

## 2023-08-04 NOTE — PROGRESS NOTES
1425 Patient arrived to PACU from OR. Monitors applied and alarms on. Report from [employer]. 1436 Patient turned and repositioned in bed. 1440 Patient tolerating ice chips appropriately. 1455 Report called to [employer] on floor. 1500 Patient transferred out of PACU to floor.
Physician Progress Note      PATIENT:               Jose Armando Gregorio  CSN #:                  [id number]  :                       2000  ADMIT DATE:       8/3/2023 9:34 AM  DISCH DATE:  RESPONDING  PROVIDER #:        Susi Gutierrez MD          QUERY TEXT:    Pt admitted with Right ureterolithiasis. Noted documentation of Urinary tract   infection in ED Provider note on . If possible, please document in   progress notes and discharge summary: If possible, please document in the   progress notes and discharge summary if UTI was: The medical record reflects the following:  Risk Factors: ureterolithiasis, Tx for UTI with Macrobid prior to ED arrival  Clinical Indicators: ureterolithiasis, H&P,  \"Presenting with right flank   pain with associated urgency and frequency, Urinalysis a couple of days ago   suggestive for UTI, Urology consulted\". Urine Nitrate - Positive  Ketone - Trace, Color - Orange, UA w +nit, proteinuria (on Azo). On 4 days of   Macrobid prior to ED arrival for Citrobacter UTI diagnosed last week. Treatment: Sodium chloride 0.9 % bolus, Ceftriaxone IV, UA,  Ancef OCOR. On   Flomax, strain all urine, Urology consulted, Plan for cystoscopy, right   retrograde pyelogram, right ureteroscopy/stone manipulation, and possible   stent placement    Thank you, Fili Huerta RN, CDS ([phone])  Options provided:  -- UTI confirmed after study  -- UTI treated and resolved  -- UTI ruled out after study  -- Other - I will add my own diagnosis  -- Disagree - Not applicable / Not valid  -- Disagree - Clinically unable to determine / Unknown  -- Refer to Clinical Documentation Reviewer    PROVIDER RESPONSE TEXT:    UTI ruled out after study.     Query created by: Fili Huerta on 2023 1:29 PM      Electronically signed by:  Susi Gutierrez MD 2023 1:49 PM
No free fluid. No free gas. Aorta: Aorta is of normal size. Negative for dissection. IVC is unremarkable. Celiac axis and SMA are patent. Portal vein is patent. PELVIS GI: No small bowel dilation. No colonic wall thickening. No enlarged lymphadenopathy. no free fluid in the pelvis. : The bladder is unremarkable in appearance. No large mass. Right ovarian cyst.  This has a Hounsfield units of 17. It measures 3.8 cm by 4.8 cm. This does not need to be followed. Multiple small follicles in left ovary. Uterus is unremarkable. Phleboliths in the pelvis Osseous: Unremarkable. S1 appears to be transitional.     Distal right/ureterovesical junction stone with mild right hydronephrosis.        Electronically signed by CHRISTOPHER Faria CNP on 8/4/2023 at 8:31 AM

## 2023-08-04 NOTE — BRIEF OP NOTE
Brief Postoperative Note      Patient: Maliha Suresh  YOB: 2000  MRN: [id number]    Date of Procedure: 8/4/2023    Pre-Op Diagnosis Codes:     * Kidney stone [N20.0]    Post-Op Diagnosis: Same       Procedure(s):  CYSTOSCOPY, URETEROSCOPY, RETROGRADE PYLEOGRAMS, STONE BASKET EXTRACTION WITH STENT INSERTION    Surgeon(s):  Merle Michaels MD    Assistant:  * No surgical staff found *    Anesthesia: General    Estimated Blood Loss (mL): Minimal    Complications: None    Specimens:   ID Type Source Tests Collected by Time Destination   A : right ureter stone  Stone (Calculus) Tissue SURGICAL PATHOLOGY, STONE ANALYSIS Merle Michaels MD 8/4/2023 1401        Implants:  Implant Name Type Inv.  Item Serial No.  Lot No. LRB No. Used Action   STENT URET 4.8FR [id number] PERCFLX HYDR+ SFT PGTL TAPR TIP - [id number]  STENT URET 4.8FR X25-27PH PERCFLX HYDR+ SFT PGTL TAPR TIP  [employer] [facility] UROLOGY- [id number] Right 1 Implanted         Drains: * No LDAs found *    Findings: 3-4 mm right distal stone      Electronically signed by Merle Michaels MD on 8/4/2023 at 2:24 PM

## 2023-08-05 ENCOUNTER — APPOINTMENT (OUTPATIENT)
Dept: GENERAL RADIOLOGY | Age: 23
DRG: 446 | End: 2023-08-05
Payer: COMMERCIAL

## 2023-08-05 VITALS
HEIGHT: 63 IN | DIASTOLIC BLOOD PRESSURE: 72 MMHG | BODY MASS INDEX: 28.35 KG/M2 | OXYGEN SATURATION: 97 % | HEART RATE: 81 BPM | RESPIRATION RATE: 17 BRPM | TEMPERATURE: 98.8 F | WEIGHT: 160 LBS | SYSTOLIC BLOOD PRESSURE: 122 MMHG

## 2023-08-05 PROCEDURE — 6360000002 HC RX W HCPCS: Performed by: STUDENT IN AN ORGANIZED HEALTH CARE EDUCATION/TRAINING PROGRAM

## 2023-08-05 PROCEDURE — 6370000000 HC RX 637 (ALT 250 FOR IP): Performed by: PHYSICIAN ASSISTANT

## 2023-08-05 PROCEDURE — 2580000003 HC RX 258: Performed by: STUDENT IN AN ORGANIZED HEALTH CARE EDUCATION/TRAINING PROGRAM

## 2023-08-05 PROCEDURE — 74018 RADEX ABDOMEN 1 VIEW: CPT

## 2023-08-05 PROCEDURE — 6370000000 HC RX 637 (ALT 250 FOR IP): Performed by: SPECIALIST

## 2023-08-05 PROCEDURE — 94761 N-INVAS EAR/PLS OXIMETRY MLT: CPT

## 2023-08-05 RX ORDER — IBUPROFEN 400 MG/1
400 TABLET ORAL EVERY 6 HOURS PRN
Qty: 20 TABLET | Refills: 0 | Status: SHIPPED | OUTPATIENT
Start: 2023-08-05

## 2023-08-05 RX ORDER — HYDROCODONE BITARTRATE AND ACETAMINOPHEN 5; 325 MG/1; MG/1
1 TABLET ORAL EVERY 6 HOURS PRN
Qty: 12 TABLET | Refills: 0 | Status: SHIPPED | OUTPATIENT
Start: 2023-08-05 | End: 2023-08-08

## 2023-08-05 RX ORDER — TAMSULOSIN HYDROCHLORIDE 0.4 MG/1
0.4 CAPSULE ORAL DAILY
Qty: 7 CAPSULE | Refills: 0 | Status: SHIPPED | OUTPATIENT
Start: 2023-08-05 | End: 2023-08-12

## 2023-08-05 RX ADMIN — OXYCODONE AND ACETAMINOPHEN 1 TABLET: 5; 325 TABLET ORAL at 12:22

## 2023-08-05 RX ADMIN — TAMSULOSIN HYDROCHLORIDE 0.4 MG: 0.4 CAPSULE ORAL at 10:22

## 2023-08-05 RX ADMIN — SODIUM CHLORIDE, PRESERVATIVE FREE 10 ML: 5 INJECTION INTRAVENOUS at 10:22

## 2023-08-05 RX ADMIN — KETOROLAC TROMETHAMINE 15 MG: 15 INJECTION, SOLUTION INTRAMUSCULAR; INTRAVENOUS at 10:50

## 2023-08-05 ASSESSMENT — PAIN SCALES - GENERAL
PAINLEVEL_OUTOF10: 6
PAINLEVEL_OUTOF10: 7
PAINLEVEL_OUTOF10: 7

## 2023-08-05 ASSESSMENT — PAIN DESCRIPTION - LOCATION
LOCATION: ABDOMEN;GROIN
LOCATION: OTHER (COMMENT);VAGINA

## 2023-08-05 ASSESSMENT — PAIN - FUNCTIONAL ASSESSMENT: PAIN_FUNCTIONAL_ASSESSMENT: ACTIVITIES ARE NOT PREVENTED

## 2023-08-05 NOTE — DISCHARGE SUMMARY
Discharge Summary    Name:  Fabrizio REDDY/Age/Sex: 2000 (20 y.o. female)   Admit Date: 8/3/2023  Discharge Date: 23    MRN & CSN:  [id number] & [id number] Encounter Date and Time 23 8:29 AM EDT    Attending:  Malu Quiroga MD Discharging Provider: Lucina Teran [facility] Course:     Brief HPI: Per initial HPI \"Billie Forrester is a 20 y.o. female with pmh of hypertension who presents with right flank pain. Patient states that her pain has been going on for the past couple of weeks which she thought was initially a UTI and she subsequently went to see OB/GYN who did a urinalysis that was positive and was started on antibiotics however she had to go back due to resistance after which antibiotics were changed however the pain persisted. Reports right flank pain and also lower back pain with some intermittent radiation to the right groin. Reports an episode of nausea today but denies any vomiting. Reports associated urgency and dysuria. Denies associated fever or chills. Denies chest pain, shortness of breath, headaches or cough. On presentation to the ED labs unremarkable. UA unremarkable. Imaging with CT of the abdomen and pelvis showed distal right ureterovesical junction stone with mild right hydronephrosis. Urology was consulted who recommended admission for further intervention. \"   Patient had cystoscopy with stone manipulation and stent placement on 2023. Patient was sent home to follow-up with Dr. Misha Washburn next week for stent removal.      Brief Problem Based Course:     Right ureterolithiasis  -Presenting with right flank pain with associated urgency and frequency. CT scan showed 3 x 5 mm distal right ureter stone with mild right hydronephrosis.   No associated STEPHEN  -Urinalysis a couple of days ago suggestive for UTI, was placed on pyridium and keflex/macrobid outpatient on  for five days  -Urology consulted, performed cystoscopy, right ureteroscopy and CALDERON  Hospitalist  8/5/2023, 8:29 AM

## 2023-08-05 NOTE — PLAN OF CARE
Problem: Pain  Goal: Verbalizes/displays adequate comfort level or baseline comfort level  Flowsheets (Taken 8/5/2023 1776)  Verbalizes/displays adequate comfort level or baseline comfort level: Encourage patient to monitor pain and request assistance

## 2023-08-08 LAB
APPEARANCE STONE: NORMAL
COMPN STONE: NORMAL
SPECIMEN WT: 20 MG

## 2024-01-24 ENCOUNTER — TELEPHONE (OUTPATIENT)
Dept: INTERNAL MEDICINE CLINIC | Age: 24
End: 2024-01-24

## 2024-01-24 NOTE — TELEPHONE ENCOUNTER
----- Message from Delmis Casas sent at 1/24/2024  1:55 PM EST -----  Subject: Message to Provider    QUESTIONS  Information for Provider? Patient is really wanting to establish care with   Cristal Duran. States she knows multiple people that have recommended her.   No availability on my end. She is wondering if there is a cancelation list   she could be added too? She is currently scheduled to see Jason Ferrari in   [place] to establish care on 03/05 but would rather schedule with Cristal   if possible. Tuesdays are the best days to schedule.  ---------------------------------------------------------------------------  --------------  CALL BACK INFO  [phone]; OK to leave message on voicemail  ---------------------------------------------------------------------------  --------------  SCRIPT ANSWERS  Relationship to Patient? Self

## 2024-01-25 NOTE — TELEPHONE ENCOUNTER
Unfortunately, I am unable to accept her as a patient at this time. Maybe Stephany may be taking new patients

## 2024-03-04 SDOH — HEALTH STABILITY: PHYSICAL HEALTH: ON AVERAGE, HOW MANY DAYS PER WEEK DO YOU ENGAGE IN MODERATE TO STRENUOUS EXERCISE (LIKE A BRISK WALK)?: 4 DAYS

## 2024-03-04 SDOH — HEALTH STABILITY: PHYSICAL HEALTH: ON AVERAGE, HOW MANY MINUTES DO YOU ENGAGE IN EXERCISE AT THIS LEVEL?: 30 MIN

## 2024-03-05 ENCOUNTER — OFFICE VISIT (OUTPATIENT)
Dept: FAMILY MEDICINE CLINIC | Age: 24
End: 2024-03-05

## 2024-03-05 VITALS
BODY MASS INDEX: 34.09 KG/M2 | WEIGHT: 192.4 LBS | RESPIRATION RATE: 18 BRPM | OXYGEN SATURATION: 98 % | HEART RATE: 88 BPM | SYSTOLIC BLOOD PRESSURE: 120 MMHG | HEIGHT: 63 IN | DIASTOLIC BLOOD PRESSURE: 82 MMHG

## 2024-03-05 DIAGNOSIS — F31.9 BIPOLAR AFFECTIVE DISORDER, REMISSION STATUS UNSPECIFIED (HCC): ICD-10-CM

## 2024-03-05 DIAGNOSIS — F41.9 ANXIETY: ICD-10-CM

## 2024-03-05 DIAGNOSIS — Z76.89 ENCOUNTER TO ESTABLISH CARE WITH NEW DOCTOR: Primary | ICD-10-CM

## 2024-03-05 DIAGNOSIS — E28.2 PCOS (POLYCYSTIC OVARIAN SYNDROME): ICD-10-CM

## 2024-03-05 DIAGNOSIS — E03.9 HYPOTHYROIDISM, UNSPECIFIED TYPE: ICD-10-CM

## 2024-03-05 DIAGNOSIS — E66.09 CLASS 1 OBESITY DUE TO EXCESS CALORIES WITHOUT SERIOUS COMORBIDITY WITH BODY MASS INDEX (BMI) OF 34.0 TO 34.9 IN ADULT: ICD-10-CM

## 2024-03-05 DIAGNOSIS — F32.9 MAJOR DEPRESSIVE DISORDER, REMISSION STATUS UNSPECIFIED, UNSPECIFIED WHETHER RECURRENT: ICD-10-CM

## 2024-03-05 DIAGNOSIS — R13.10 DYSPHAGIA, UNSPECIFIED TYPE: ICD-10-CM

## 2024-03-05 DIAGNOSIS — N20.0 CALCULUS OF KIDNEY: ICD-10-CM

## 2024-03-05 SDOH — ECONOMIC STABILITY: FOOD INSECURITY: WITHIN THE PAST 12 MONTHS, THE FOOD YOU BOUGHT JUST DIDN'T LAST AND YOU DIDN'T HAVE MONEY TO GET MORE.: NEVER TRUE

## 2024-03-05 SDOH — ECONOMIC STABILITY: HOUSING INSECURITY
IN THE LAST 12 MONTHS, WAS THERE A TIME WHEN YOU DID NOT HAVE A STEADY PLACE TO SLEEP OR SLEPT IN A SHELTER (INCLUDING NOW)?: NO

## 2024-03-05 SDOH — ECONOMIC STABILITY: INCOME INSECURITY: HOW HARD IS IT FOR YOU TO PAY FOR THE VERY BASICS LIKE FOOD, HOUSING, MEDICAL CARE, AND HEATING?: NOT HARD AT ALL

## 2024-03-05 SDOH — ECONOMIC STABILITY: FOOD INSECURITY: WITHIN THE PAST 12 MONTHS, YOU WORRIED THAT YOUR FOOD WOULD RUN OUT BEFORE YOU GOT MONEY TO BUY MORE.: NEVER TRUE

## 2024-03-05 ASSESSMENT — PATIENT HEALTH QUESTIONNAIRE - PHQ9
SUM OF ALL RESPONSES TO PHQ9 QUESTIONS 1 & 2: 0
SUM OF ALL RESPONSES TO PHQ QUESTIONS 1-9: 0
2. FEELING DOWN, DEPRESSED OR HOPELESS: 0
1. LITTLE INTEREST OR PLEASURE IN DOING THINGS: 0
SUM OF ALL RESPONSES TO PHQ QUESTIONS 1-9: 0

## 2024-03-05 NOTE — PATIENT INSTRUCTIONS
We are committed to providing you the best care possible.    If you receive a survey after visiting one of our offices, please take time to share your experience concerning your physician office visit.  These surveys are confidential and no health information about you is shared.    We are eager to improve for you and continue to give you satisfactory care, we are counting on your feedback to help make that happen.            Welcome to [facility]:    Did you know we now have a faster way for you to move through your appointment? For your convenience, we now have digital registration available. When you schedule your next appointment, you will receive a link via your email as well as a text message that will allow you to complete any paperwork digitally before your appointment.

## 2024-03-05 NOTE — PROGRESS NOTES
3/5/2024    Billie Castro    Chief Complaint   Patient presents with    New Patient    Other     Wanting to discuss PCOS symptoms       HPI  History was obtained from pt.  Billie is a 23 y.o. female with a PMHx of    Patient Active Problem List   Diagnosis    Urolithiasis      who presents today to establish care.    Patient Active Problem List   Diagnosis    Urolithiasis   Adderall 30 mg XR daily  Wellbutrin 200 mg BID  Buspar 10mg BID  Levothyroxine 100mcg  Quetiapine 200 mg nightly    Specialists:  Dr Hernandez - psychiatry in [place]  Dr Toribio - OBGYN    No pregnancies  Just got off nexplanon, hx of irregular periods, spotting between periods    Acute Concerns:  PCOS - hirsuitism on the face, for the last few years but getting worse the last 6 months. Suddenly gaining a lot of weight. Irregular periods, mood swings.     Whenever pt eats she gets a mucus-y cough, no matter what she eats it starts and she has to cough up thick mucus, allergy meds helped a little bit. Has had for years, doesn't even want to eat out. Deneis nausea, dysphagia or heartburn    Family History:    Diabetes, obesity, depression     Recent Labs:  unremarkable    Care Gaps:  HPV vaccine    1. Encounter to establish care with new doctor    2. Calculus of kidney    3. Major depressive disorder, remission status unspecified, unspecified whether recurrent    4. Anxiety    5. Bipolar affective disorder, remission status unspecified (HCC)    6. Class 1 obesity due to excess calories without serious comorbidity with body mass index (BMI) of 34.0 to 34.9 in adult    7. PCOS (polycystic ovarian syndrome)    8. Hypothyroidism, unspecified type    9. Dysphagia, unspecified type         REVIEW OF SYMPTOMS    Review of Systems    PAST MEDICAL HISTORY  Past Medical History:   Diagnosis Date    ADHD (attention deficit hyperactivity disorder)     Anxiety     Depression     Headache(784.0)     Hypertension     Hypothyroidism     PCOS (polycystic ovarian

## 2024-03-07 ENCOUNTER — TELEPHONE (OUTPATIENT)
Dept: GASTROENTEROLOGY | Age: 24
End: 2024-03-07

## 2024-04-09 ENCOUNTER — OFFICE VISIT (OUTPATIENT)
Dept: GASTROENTEROLOGY | Age: 24
End: 2024-04-09
Payer: COMMERCIAL

## 2024-04-09 VITALS
HEART RATE: 98 BPM | TEMPERATURE: 97.3 F | WEIGHT: 189.6 LBS | SYSTOLIC BLOOD PRESSURE: 126 MMHG | DIASTOLIC BLOOD PRESSURE: 60 MMHG | HEIGHT: 63 IN | OXYGEN SATURATION: 98 % | BODY MASS INDEX: 33.59 KG/M2

## 2024-04-09 DIAGNOSIS — R09.A2 GLOBUS SENSATION: Primary | ICD-10-CM

## 2024-04-09 DIAGNOSIS — R05.3 CHRONIC COUGH: ICD-10-CM

## 2024-04-09 PROCEDURE — G8427 DOCREV CUR MEDS BY ELIG CLIN: HCPCS | Performed by: INTERNAL MEDICINE

## 2024-04-09 PROCEDURE — 1036F TOBACCO NON-USER: CPT | Performed by: INTERNAL MEDICINE

## 2024-04-09 PROCEDURE — 99203 OFFICE O/P NEW LOW 30 MIN: CPT | Performed by: INTERNAL MEDICINE

## 2024-04-09 PROCEDURE — G8417 CALC BMI ABV UP PARAM F/U: HCPCS | Performed by: INTERNAL MEDICINE

## 2024-04-09 RX ORDER — OMEPRAZOLE 40 MG/1
40 CAPSULE, DELAYED RELEASE ORAL
Qty: 30 CAPSULE | Refills: 0 | Status: SHIPPED | OUTPATIENT
Start: 2024-04-09

## 2024-04-09 RX ORDER — QUETIAPINE FUMARATE 100 MG/1
TABLET, FILM COATED ORAL
COMMUNITY
Start: 2024-03-25

## 2024-04-09 RX ORDER — LEVOTHYROXINE SODIUM 112 MCG
TABLET ORAL
COMMUNITY
Start: 2024-03-25

## 2024-04-09 NOTE — PROGRESS NOTES
[facility]             MD Kirsty Malik MD Carol Christensen, APRN-CNP       Shira Iraheta, APRN-CNP             [address] [place], [place] [id number] [phone] fax [phone]        Gastroenterology Clinic Consultation    Kirsty Castellanos MD  Encounter Date: 04/09/24     CC: Dysphagia (Pt states when she eats anything she gets a mucus filled cough- no trouble swallowing, pt states this has been going on for a couple years.  )       Jason Ferrari PA  [address] [place] [address]     History obtained from: patient,  medical records     Subjective:       Billie Castro is an 23 y.o. female with past medical history of ADHD, depression, hypertension, PCOS who presents for Dysphagia (Pt states when she eats anything she gets a mucus filled cough- no trouble swallowing, pt states this has been going on for a couple years.  )    Patient mentions that she feels she has something stuck in her throat.  She also has significant mucus filled cough that has been going on for a couple of years. She does have post nasal drip but has not had any ENT evaluation. She denies any GERD or heartburn. She denies any dysphagia. No melena or hematochezia, no diarrhea or constipation.      Patient Active Problem List   Diagnosis    Urolithiasis         Past Medical History:   Diagnosis Date    ADHD (attention deficit hyperactivity disorder)     Anxiety     Depression     Headache(784.0)     Hypertension     Hypothyroidism     PCOS (polycystic ovarian syndrome)         Past Surgical History:   Procedure Laterality Date    CYSTOSCOPY Right 8/4/2023    CYSTOSCOPY, URETEROSCOPY, RETROGRADE PYLEOGRAMS, STONE BASKET EXTRACTION WITH STENT INSERTION performed by Miriam Lackey MD at [facility] OR        Family History   Problem Relation Age of Onset    No Known Problems Mother     Depression Father     Mental Illness

## 2024-05-17 RX ORDER — MAGNESIUM 30 MG
30 TABLET ORAL 2 TIMES DAILY
COMMUNITY

## 2024-05-17 NOTE — PROGRESS NOTES
.Surgery @ [facility] on 5/28/24 you will be called 5/24/24 with times    NOTHING TO EAT OR DRINK AFTER MIDNIGHT DAY OF SURGERY    1. Enter thru the hospital main entrance on day of surgery, check in at the Information Desk. If you arrive prior to 6:00am, enter thru the ER entrance.    2. Follow the directions as prescribed by the doctor for your procedure and medications.         Morning of surgery take: Omeprazole, Synthroid & Buspar with sips of water         Stop vitamins, supplements and NSAIDS:  5/20/24    3. Check with your Doctor regarding stopping blood thinners and follow their instructions.    4. Do not smoke, vape or use chewing tobacco morning of surgery. Do not drink any alcoholic beverages 24 hours prior to surgery.       This includes NA Beer. No street drugs 7 days prior to surgery.    5. If you have dentures, contacts of glasses they will be removed before going to the OR; please bring a case.    6. Please bring picture ID, insurance card, paperwork from the doctor’s office (H & P, Consent, & card for implantable devices).    7. Take a shower with an antibacterial soap the night before surgery and the morning of surgery. Do not put anything on your skin      After your morning shower.    8. You will need a responsible adult to drive you home and check on you after surgery.

## 2024-05-24 ENCOUNTER — ANESTHESIA EVENT (OUTPATIENT)
Dept: ENDOSCOPY | Age: 24
End: 2024-05-24
Payer: COMMERCIAL

## 2024-05-24 NOTE — ANESTHESIA PRE PROCEDURE
daily     • buPROPion (WELLBUTRIN SR) 200 MG extended release tablet Take 1 tablet by mouth in the morning and 1 tablet in the evening.     • busPIRone (BUSPAR) 10 MG tablet Take 1 tablet by mouth in the morning and 1 tablet in the evening. 08/03/23 prescription is for 2- tablets TID patient reports only taking 10 mg BID.         Allergies:    Allergies   Allergen Reactions   • Lorabid [Loracarbef] Hives       Problem List:    Patient Active Problem List   Diagnosis Code   • Urolithiasis N20.9       Past Medical History:        Diagnosis Date   • ADHD (attention deficit hyperactivity disorder)    • Anxiety    • Depression    • Headache(784.0)    • Hypertension    • Hypothyroidism    • PCOS (polycystic ovarian syndrome)        Past Surgical History:        Procedure Laterality Date   • CYSTOSCOPY Right 8/4/2023    CYSTOSCOPY, URETEROSCOPY, RETROGRADE PYLEOGRAMS, STONE BASKET EXTRACTION WITH STENT INSERTION performed by Miriam Lackey MD at [facility] OR       Social History:    Social History     Tobacco Use   • Smoking status: Never   • Smokeless tobacco: Never   Substance Use Topics   • Alcohol use: Yes     Alcohol/week: 2.0 standard drinks of alcohol     Types: 2 Glasses of wine per week     Comment: occ                                Counseling given: Not Answered      Vital Signs (Current):   Vitals:    05/17/24 1213   Weight: 85.7 kg (189 lb)   Height: 1.6 m (5' 3\")                                              BP Readings from Last 3 Encounters:   04/09/24 126/60   03/05/24 120/82   08/05/23 122/72       NPO Status:                                                                                 BMI:   Wt Readings from Last 3 Encounters:   04/09/24 86 kg (189 lb 9.6 oz)   03/05/24 87.3 kg (192 lb 6.4 oz)   08/03/23 72.6 kg (160 lb)     Body mass index is 33.48 kg/m².    CBC:   Lab Results   Component Value Date/Time    WBC 6.8 08/04/2023 04:47 PM    RBC 4.37 08/04/2023 04:47 PM    HGB 12.5 08/04/2023 04:47 PM

## 2024-05-24 NOTE — PROGRESS NOTES
Spoke with patient and she will arrive at 0700 at [facility] on 5/28/2024 for her procedure at 0830.

## 2024-05-28 ENCOUNTER — HOSPITAL ENCOUNTER (OUTPATIENT)
Age: 24
Setting detail: OUTPATIENT SURGERY
Discharge: HOME | End: 2024-05-28
Attending: INTERNAL MEDICINE | Admitting: INTERNAL MEDICINE
Payer: COMMERCIAL

## 2024-05-28 ENCOUNTER — ANESTHESIA (OUTPATIENT)
Dept: ENDOSCOPY | Age: 24
End: 2024-05-28
Payer: COMMERCIAL

## 2024-05-28 VITALS
WEIGHT: 189 LBS | RESPIRATION RATE: 18 BRPM | TEMPERATURE: 97.6 F | DIASTOLIC BLOOD PRESSURE: 78 MMHG | HEIGHT: 63 IN | BODY MASS INDEX: 33.49 KG/M2 | OXYGEN SATURATION: 100 % | SYSTOLIC BLOOD PRESSURE: 119 MMHG | HEART RATE: 80 BPM

## 2024-05-28 DIAGNOSIS — R09.A2 GLOBUS SENSATION: ICD-10-CM

## 2024-05-28 PROBLEM — K22.9 NODULE OF ESOPHAGUS: Status: ACTIVE | Noted: 2024-05-28

## 2024-05-28 LAB — PREGNANCY TEST URINE, POC: NEGATIVE

## 2024-05-28 PROCEDURE — 7100000011 HC PHASE II RECOVERY - ADDTL 15 MIN: Performed by: INTERNAL MEDICINE

## 2024-05-28 PROCEDURE — 2709999900 HC NON-CHARGEABLE SUPPLY: Performed by: INTERNAL MEDICINE

## 2024-05-28 PROCEDURE — 81025 URINE PREGNANCY TEST: CPT

## 2024-05-28 PROCEDURE — 2580000003 HC RX 258: Performed by: ANESTHESIOLOGY

## 2024-05-28 PROCEDURE — 3700000001 HC ADD 15 MINUTES (ANESTHESIA): Performed by: INTERNAL MEDICINE

## 2024-05-28 PROCEDURE — 7100000010 HC PHASE II RECOVERY - FIRST 15 MIN: Performed by: INTERNAL MEDICINE

## 2024-05-28 PROCEDURE — 2500000003 HC RX 250 WO HCPCS: Performed by: NURSE ANESTHETIST, CERTIFIED REGISTERED

## 2024-05-28 PROCEDURE — 6360000002 HC RX W HCPCS: Performed by: NURSE ANESTHETIST, CERTIFIED REGISTERED

## 2024-05-28 PROCEDURE — 88342 IMHCHEM/IMCYTCHM 1ST ANTB: CPT | Performed by: PATHOLOGY

## 2024-05-28 PROCEDURE — 3609019100 HC ESOPHAGEAL DILATION: Performed by: INTERNAL MEDICINE

## 2024-05-28 PROCEDURE — 88305 TISSUE EXAM BY PATHOLOGIST: CPT | Performed by: PATHOLOGY

## 2024-05-28 PROCEDURE — 3700000000 HC ANESTHESIA ATTENDED CARE: Performed by: INTERNAL MEDICINE

## 2024-05-28 PROCEDURE — C1726 CATH, BAL DIL, NON-VASCULAR: HCPCS | Performed by: INTERNAL MEDICINE

## 2024-05-28 RX ORDER — PROPOFOL 10 MG/ML
INJECTION, EMULSION INTRAVENOUS PRN
Status: DISCONTINUED | OUTPATIENT
Start: 2024-05-28 | End: 2024-05-28 | Stop reason: SDUPTHER

## 2024-05-28 RX ORDER — SODIUM CHLORIDE, SODIUM LACTATE, POTASSIUM CHLORIDE, CALCIUM CHLORIDE 600; 310; 30; 20 MG/100ML; MG/100ML; MG/100ML; MG/100ML
INJECTION, SOLUTION INTRAVENOUS CONTINUOUS
Status: DISCONTINUED | OUTPATIENT
Start: 2024-05-28 | End: 2024-05-28 | Stop reason: HOSPADM

## 2024-05-28 RX ORDER — LIDOCAINE HYDROCHLORIDE 20 MG/ML
INJECTION, SOLUTION INFILTRATION; PERINEURAL PRN
Status: DISCONTINUED | OUTPATIENT
Start: 2024-05-28 | End: 2024-05-28 | Stop reason: SDUPTHER

## 2024-05-28 RX ADMIN — LIDOCAINE HYDROCHLORIDE 50 MG: 20 INJECTION, SOLUTION INFILTRATION; PERINEURAL at 09:21

## 2024-05-28 RX ADMIN — SODIUM CHLORIDE, POTASSIUM CHLORIDE, SODIUM LACTATE AND CALCIUM CHLORIDE: 600; 310; 30; 20 INJECTION, SOLUTION INTRAVENOUS at 08:00

## 2024-05-28 RX ADMIN — PROPOFOL 100 MG: 10 INJECTION, EMULSION INTRAVENOUS at 09:25

## 2024-05-28 RX ADMIN — PROPOFOL 100 MG: 10 INJECTION, EMULSION INTRAVENOUS at 09:22

## 2024-05-28 RX ADMIN — PROPOFOL 100 MG: 10 INJECTION, EMULSION INTRAVENOUS at 09:27

## 2024-05-28 RX ADMIN — PROPOFOL 50 MG: 10 INJECTION, EMULSION INTRAVENOUS at 09:23

## 2024-05-28 ASSESSMENT — PAIN - FUNCTIONAL ASSESSMENT: PAIN_FUNCTIONAL_ASSESSMENT: 0-10

## 2024-05-28 ASSESSMENT — PAIN SCALES - GENERAL
PAINLEVEL_OUTOF10: 0
PAINLEVEL_OUTOF10: 0

## 2024-05-28 NOTE — PROGRESS NOTES
0943 Pt. Brought back to unit, bedside report received from EDILSON Wu. Dr. Castellanos at bedside. Pt. Provided with water, mother at bedside. Denies further needs at this time. Call light in reach.   1000  DC instructions reviewed.  1005 Pt. Getting dressed.  1015 Pt. To DC home with mother.

## 2024-05-28 NOTE — PROGRESS NOTES
Received report from EDILSON Henson. Patient alert and oriented. Verified patient's name, , allergies and procedure.  No beta blockers, no blood thinners, no implants. H&P on chart. Mother, Yanet at bedside.

## 2024-05-28 NOTE — DISCHARGE INSTRUCTIONS
[facility]  [phone]    Do not drive, work around machines or use equipment.  Do not drink any alcoholic beverages.  Do not smoke while alone.  Avoid making important decisions.  Plan to spend a quiet, relaxed evening @ home.  Resume normal activities as you begin to feel better.  Eat lightly for your first meal, then gradually increase your diet to what is normal for you.  In case of nausea, avoid food and drink only clear liquids.  Resume food as nausea ceases.  Notify your surgeon if you experience fever, chills, large amount of bleeding, difficulty breathing, persistent nausea and vomiting or any other disturbing problem.  Call for a follow-up appointment with your surgeon.       EGD        FOLLOW UP APPOINTMENT IN 1-2 WEEKS OR AS NEEDED.    REPEAT PROCEDURE AS  NEEDED.    PATHOLOGY PENDING     What to Expect at Home  Your Recovery:  The only discomfort after your EGD is generally limited to a mild soreness of the throat, which may last a day or two. Call your physician immediately if you have severe chest pain, shortness of breath or a temperature of 100 degrees or higher if taken orally.    How can you care for yourself at home?  Activity  Rest as much as you need to after you go home.  You should be able to go back to your usual activities the day after the test.  Diet  Follow your doctor's directions for eating after the test.  Drink plenty of fluids (unless your doctor has told you not to).  Medications  If you have a sore throat the day after the test, use an over-the-counter spray to numb your throat.  Your doctor will tell you if and when you can restart your medicines. He or she will also give you instructions about taking any new medicines.  If you take blood thinners, such as warfarin (Coumadin), clopidogrel (Plavix), or aspirin, be sure to talk to your doctor. He or she will tell you if and when to start taking those medicines again. Make sure that you

## 2024-05-28 NOTE — H&P
ENDOSCOPY   Pre-operative History and Physical    Patient: Billie Castro  : 2000      History Obtained From:  patient, electronic medical record        HISTORY OF PRESENT ILLNESS:                The patient is a 23 y.o. female with significant past medical history as below who presents for EGD    Indication Chronic cough, Globus sensation    Past Medical History:        Diagnosis Date    ADHD (attention deficit hyperactivity disorder)     Anxiety     Depression     Headache(784.0)     Hypertension     Hypothyroidism     PCOS (polycystic ovarian syndrome)        Past Surgical History:        Procedure Laterality Date    CYSTOSCOPY Right 2023    CYSTOSCOPY, URETEROSCOPY, RETROGRADE PYLEOGRAMS, STONE BASKET EXTRACTION WITH STENT INSERTION performed by Miriam Lackey MD at [facility] OR         Current Medications:    Medications    Prior to Admission medications    Medication Sig Start Date End Date Taking? Authorizing Provider   magnesium 30 MG tablet Take 1 tablet by mouth 2 times daily   Yes Jovany Borrego MD   SYNTHROID 112 MCG tablet  3/25/24   Jovany Borrego MD   SEROQUEL 100 MG tablet  3/25/24   Jovany Borrego MD   omeprazole (PRILOSEC) 40 MG delayed release capsule Take 1 capsule by mouth every morning (before breakfast) 24   Kirsty Castellanos MD   amphetamine-dextroamphetamine (ADDERALL XR) 30 MG extended release capsule Take 1 capsule by mouth daily. 23 prescription is noted for BID patient only takes once daily 23   Jovany Borrego MD   buPROPion (WELLBUTRIN SR) 200 MG extended release tablet Take 1 tablet by mouth in the morning and 1 tablet in the evening.    Jovany Borrego MD   busPIRone (BUSPAR) 10 MG tablet Take 1 tablet by mouth in the morning and 1 tablet in the evening. 23 prescription is for 2- tablets TID patient reports only taking 10 mg BID.    Jovany Borrego MD      Scheduled Medications:   Infusions:    lactated ringers IV

## 2024-05-28 NOTE — PROGRESS NOTES
Patient is back to baseline.  Alert and oriented.  Side rails up x 2, call light in reach.  Report given to EDILSON Ferreira.  Mother, Yanet at bedside.

## 2024-05-28 NOTE — ANESTHESIA POSTPROCEDURE EVALUATION
Department of Anesthesiology  Postprocedure Note    Patient: Billie Castro  MRN: [id number]  YOB: 2000  Date of evaluation: 5/28/2024    Procedure Summary       Date: 05/28/24 Room / Location: [place] [id number] / [facility]    Anesthesia Start: 0918 Anesthesia Stop: 0941    Procedure: ESOPHAGOGASTRODUODENOSCOPY Diagnosis:       Globus sensation      (Globus sensation [R09.A2])    Surgeons: Kirsty Castellanos MD Responsible Provider: Elroy Anthony MD    Anesthesia Type: MAC ASA Status: 2            Anesthesia Type: No value filed.    Moi Phase I: Moi Score: 10    Moi Phase II:      Anesthesia Post Evaluation    Patient location during evaluation: PACU  Patient participation: complete - patient participated  Level of consciousness: awake and alert  Pain score: 0  Airway patency: patent  Nausea & Vomiting: no nausea and no vomiting  Cardiovascular status: blood pressure returned to baseline  Respiratory status: acceptable  Hydration status: euvolemic  Pain management: adequate    No notable events documented.

## 2025-07-08 ENCOUNTER — OFFICE VISIT (OUTPATIENT)
Dept: OBGYN | Age: 25
End: 2025-07-08
Payer: COMMERCIAL

## 2025-07-08 VITALS
WEIGHT: 183.6 LBS | BODY MASS INDEX: 32.53 KG/M2 | HEIGHT: 63 IN | DIASTOLIC BLOOD PRESSURE: 93 MMHG | HEART RATE: 89 BPM | SYSTOLIC BLOOD PRESSURE: 130 MMHG

## 2025-07-08 DIAGNOSIS — Z20.2 STD EXPOSURE: ICD-10-CM

## 2025-07-08 DIAGNOSIS — L68.0 HIRSUTISM: ICD-10-CM

## 2025-07-08 DIAGNOSIS — R39.15 URINARY URGENCY: ICD-10-CM

## 2025-07-08 DIAGNOSIS — E03.9 HYPOTHYROIDISM, UNSPECIFIED TYPE: ICD-10-CM

## 2025-07-08 DIAGNOSIS — R10.2 ACUTE PELVIC PAIN: Primary | ICD-10-CM

## 2025-07-08 PROCEDURE — 36415 COLL VENOUS BLD VENIPUNCTURE: CPT | Performed by: OBSTETRICS & GYNECOLOGY

## 2025-07-08 PROCEDURE — 99204 OFFICE O/P NEW MOD 45 MIN: CPT | Performed by: OBSTETRICS & GYNECOLOGY

## 2025-07-08 RX ORDER — CELECOXIB 100 MG/1
100 CAPSULE ORAL DAILY
Qty: 60 CAPSULE | Refills: 0 | Status: SHIPPED | OUTPATIENT
Start: 2025-07-08

## 2025-07-08 SDOH — ECONOMIC STABILITY: FOOD INSECURITY: WITHIN THE PAST 12 MONTHS, THE FOOD YOU BOUGHT JUST DIDN'T LAST AND YOU DIDN'T HAVE MONEY TO GET MORE.: NEVER TRUE

## 2025-07-08 SDOH — ECONOMIC STABILITY: FOOD INSECURITY: WITHIN THE PAST 12 MONTHS, YOU WORRIED THAT YOUR FOOD WOULD RUN OUT BEFORE YOU GOT MONEY TO BUY MORE.: NEVER TRUE

## 2025-07-08 ASSESSMENT — PATIENT HEALTH QUESTIONNAIRE - PHQ9
SUM OF ALL RESPONSES TO PHQ QUESTIONS 1-9: 0
SUM OF ALL RESPONSES TO PHQ QUESTIONS 1-9: 0
4. FEELING TIRED OR HAVING LITTLE ENERGY: NOT AT ALL
7. TROUBLE CONCENTRATING ON THINGS, SUCH AS READING THE NEWSPAPER OR WATCHING TELEVISION: NOT AT ALL
3. TROUBLE FALLING OR STAYING ASLEEP: NOT AT ALL
6. FEELING BAD ABOUT YOURSELF - OR THAT YOU ARE A FAILURE OR HAVE LET YOURSELF OR YOUR FAMILY DOWN: NOT AT ALL
SUM OF ALL RESPONSES TO PHQ QUESTIONS 1-9: 0
8. MOVING OR SPEAKING SO SLOWLY THAT OTHER PEOPLE COULD HAVE NOTICED. OR THE OPPOSITE, BEING SO FIGETY OR RESTLESS THAT YOU HAVE BEEN MOVING AROUND A LOT MORE THAN USUAL: NOT AT ALL
1. LITTLE INTEREST OR PLEASURE IN DOING THINGS: NOT AT ALL
SUM OF ALL RESPONSES TO PHQ QUESTIONS 1-9: 0
9. THOUGHTS THAT YOU WOULD BE BETTER OFF DEAD, OR OF HURTING YOURSELF: NOT AT ALL
10. IF YOU CHECKED OFF ANY PROBLEMS, HOW DIFFICULT HAVE THESE PROBLEMS MADE IT FOR YOU TO DO YOUR WORK, TAKE CARE OF THINGS AT HOME, OR GET ALONG WITH OTHER PEOPLE: NOT DIFFICULT AT ALL
5. POOR APPETITE OR OVEREATING: NOT AT ALL
2. FEELING DOWN, DEPRESSED OR HOPELESS: NOT AT ALL

## 2025-07-08 NOTE — PROGRESS NOTES
is present.   Musculoskeletal:         General: Normal range of motion.      Cervical back: Normal range of motion and neck supple. No rigidity.   Skin:     General: Skin is warm and dry.   Neurological:      General: No focal deficit present.      Mental Status: She is alert and oriented to person, place, and time.   Psychiatric:         Mood and Affect: Mood normal.         Behavior: Behavior normal.         No results found for this visit on 07/08/25.    ASSESSMENT AND PLAN   Diagnosis Orders   1. Acute pelvic pain  US NON OB TRANSVAGINAL    Urinalysis with Microscopic    celecoxib (CELEBREX) 100 MG capsule    CBC      2. STD exposure  Hepatitis B Surface Antigen    Herpes Simplex Virus,I/II,IgG    HIV Screen    Syphilis Antibody Cascading Reflex    C.trachomatis N.gonorrhoeae DNA, Urine    Trichomonas by EIA      3. Urinary urgency  Urinalysis with Microscopic    Culture, Urine      4. Hirsutism  Testosterone, free, total      5. Hypothyroidism, unspecified type  TSH reflex to FT4, FT3          Return in about 4 weeks (around 8/5/2025).    Torito Quan MD

## 2025-07-09 ENCOUNTER — RESULTS FOLLOW-UP (OUTPATIENT)
Dept: OBGYN | Age: 25
End: 2025-07-09

## 2025-07-09 LAB
BACTERIA URNS QL MICRO: ABNORMAL /HPF
BILIRUB UR QL STRIP.AUTO: NEGATIVE
C TRACH DNA UR QL NAA+PROBE: NEGATIVE
CLARITY UR: CLEAR
COLOR UR: YELLOW
DEPRECATED RDW RBC AUTO: 13.3 % (ref 12.4–15.4)
EPI CELLS #/AREA URNS AUTO: 3 /HPF (ref 0–5)
GLUCOSE UR STRIP.AUTO-MCNC: NEGATIVE MG/DL
HBV SURFACE AG SERPL QL IA: NORMAL
HCT VFR BLD AUTO: 44.9 % (ref 36–48)
HERPES SIMPLEX VIRUS 1 IGG: POSITIVE
HERPES SIMPLEX VIRUS 2 IGG: NEGATIVE
HGB BLD-MCNC: 15.1 G/DL (ref 12–16)
HGB UR QL STRIP.AUTO: NEGATIVE
HIV 1+2 AB+HIV1 P24 AG SERPL QL IA: NORMAL
HIV 2 AB SERPL QL IA: NORMAL
HIV1 AB SERPL QL IA: NORMAL
HIV1 P24 AG SERPL QL IA: NORMAL
HYALINE CASTS #/AREA URNS AUTO: 0 /LPF (ref 0–8)
KETONES UR STRIP.AUTO-MCNC: ABNORMAL MG/DL
LEUKOCYTE ESTERASE UR QL STRIP.AUTO: NEGATIVE
MCH RBC QN AUTO: 30.2 PG (ref 26–34)
MCHC RBC AUTO-ENTMCNC: 33.7 G/DL (ref 31–36)
MCV RBC AUTO: 89.7 FL (ref 80–100)
N GONORRHOEA DNA UR QL NAA+PROBE: NEGATIVE
NITRITE UR QL STRIP.AUTO: NEGATIVE
PH UR STRIP.AUTO: 6 [PH] (ref 5–8)
PLATELET # BLD AUTO: 367 K/UL (ref 135–450)
PMV BLD AUTO: 7.3 FL (ref 5–10.5)
PROT UR STRIP.AUTO-MCNC: NEGATIVE MG/DL
RBC # BLD AUTO: 5 M/UL (ref 4–5.2)
RBC CLUMPS #/AREA URNS AUTO: 0 /HPF (ref 0–4)
REAGIN+T PALLIDUM IGG+IGM SERPL-IMP: NORMAL
SP GR UR STRIP.AUTO: 1.02 (ref 1–1.03)
SPECIMEN TYPE: NORMAL
TRICHOMONAS VAGINALIS SCREEN: NEGATIVE
TSH SERPL DL<=0.005 MIU/L-ACNC: 0.54 UIU/ML (ref 0.27–4.2)
UA DIPSTICK W REFLEX MICRO PNL UR: ABNORMAL
URN SPEC COLLECT METH UR: ABNORMAL
UROBILINOGEN UR STRIP-ACNC: 0.2 E.U./DL
WBC # BLD AUTO: 6.1 K/UL (ref 4–11)
WBC #/AREA URNS AUTO: 0 /HPF (ref 0–5)

## 2025-07-10 LAB
BACTERIA UR CULT: NORMAL
SHBG SERPL-SCNC: 35 NMOL/L (ref 25–122)
TESTOST FREE SERPL-MCNC: 6.8 PG/ML (ref 0.8–7.4)
TESTOST SERPL-MCNC: 39 NG/DL (ref 8–48)

## 2025-08-13 RX ORDER — PHENAZOPYRIDINE HYDROCHLORIDE 200 MG/1
200 TABLET, FILM COATED ORAL 3 TIMES DAILY PRN
COMMUNITY

## (undated) DEVICE — SYRINGE INFL 60ML DISP ALLIANCE II

## (undated) DEVICE — BLUNTFILL: Brand: MONOJECT

## (undated) DEVICE — CATHETER URET 5FR L70CM POLYUR CONE FLX TIP KINK RESIST W/

## (undated) DEVICE — UROLOGIC DRAIN BAG: Brand: UNBRANDED

## (undated) DEVICE — GLOVE SURG SZ 65 CRM LTX FREE POLYISOPRENE POLYMER BEAD ANTI

## (undated) DEVICE — STERILE LATEX POWDER-FREE SURGICAL GLOVESWITH NITRILE COATING: Brand: PROTEXIS

## (undated) DEVICE — GLOVE ORANGE PI 8   MSG9080

## (undated) DEVICE — SYRINGE MED 30ML STD CLR PLAS LUERLOCK TIP N CTRL DISP

## (undated) DEVICE — TUBING, SUCTION, 9/32" X 20', STRAIGHT: Brand: MEDLINE INDUSTRIES, INC.

## (undated) DEVICE — JELLY,LUBE,STERILE,FLIP TOP,TUBE,2-OZ: Brand: MEDLINE

## (undated) DEVICE — FORCEPS BX L240CM JAW DIA2.8MM L CAP W/ NDL MIC MESH TOOTH

## (undated) DEVICE — NITINOL STONE RETRIEVAL BASKET: Brand: ZERO TIP

## (undated) DEVICE — ENDOSCOPY KIT: Brand: MEDLINE INDUSTRIES, INC.

## (undated) DEVICE — SYRINGE MED 50ML LUERLOCK TIP

## (undated) DEVICE — GUIDEWIRE ENDOSCP L150CM DIA0.035IN TIP 3CM PTFE NIT

## (undated) DEVICE — GOWN,ECLIPSE,POLYRNF,BRTHSLV,L,30/CS: Brand: MEDLINE

## (undated) DEVICE — HERCULES 3 STAGE BALLOON ESOPHAGEAL: Brand: HERCULES

## (undated) DEVICE — PREMIUM WET SKIN PREP TRAY: Brand: MEDLINE INDUSTRIES, INC.